# Patient Record
Sex: FEMALE | Race: OTHER | HISPANIC OR LATINO
[De-identification: names, ages, dates, MRNs, and addresses within clinical notes are randomized per-mention and may not be internally consistent; named-entity substitution may affect disease eponyms.]

---

## 2018-07-09 ENCOUNTER — TRANSCRIPTION ENCOUNTER (OUTPATIENT)
Age: 28
End: 2018-07-09

## 2019-09-12 ENCOUNTER — TRANSCRIPTION ENCOUNTER (OUTPATIENT)
Age: 29
End: 2019-09-12

## 2020-11-17 ENCOUNTER — TRANSCRIPTION ENCOUNTER (OUTPATIENT)
Age: 30
End: 2020-11-17

## 2021-01-11 ENCOUNTER — TRANSCRIPTION ENCOUNTER (OUTPATIENT)
Age: 31
End: 2021-01-11

## 2021-03-07 ENCOUNTER — TRANSCRIPTION ENCOUNTER (OUTPATIENT)
Age: 31
End: 2021-03-07

## 2021-12-14 ENCOUNTER — EMERGENCY (EMERGENCY)
Facility: HOSPITAL | Age: 31
LOS: 0 days | Discharge: HOME | End: 2021-12-14
Attending: EMERGENCY MEDICINE | Admitting: EMERGENCY MEDICINE
Payer: COMMERCIAL

## 2021-12-14 VITALS
DIASTOLIC BLOOD PRESSURE: 70 MMHG | RESPIRATION RATE: 16 BRPM | HEART RATE: 90 BPM | WEIGHT: 154.1 LBS | TEMPERATURE: 98 F | OXYGEN SATURATION: 97 % | SYSTOLIC BLOOD PRESSURE: 120 MMHG

## 2021-12-14 VITALS
DIASTOLIC BLOOD PRESSURE: 66 MMHG | SYSTOLIC BLOOD PRESSURE: 105 MMHG | HEART RATE: 71 BPM | OXYGEN SATURATION: 97 % | TEMPERATURE: 98 F | RESPIRATION RATE: 16 BRPM

## 2021-12-14 DIAGNOSIS — R07.89 OTHER CHEST PAIN: ICD-10-CM

## 2021-12-14 DIAGNOSIS — R11.0 NAUSEA: ICD-10-CM

## 2021-12-14 DIAGNOSIS — R10.9 UNSPECIFIED ABDOMINAL PAIN: ICD-10-CM

## 2021-12-14 DIAGNOSIS — Z88.0 ALLERGY STATUS TO PENICILLIN: ICD-10-CM

## 2021-12-14 LAB
ALBUMIN SERPL ELPH-MCNC: 5.4 G/DL — HIGH (ref 3.5–5.2)
ALP SERPL-CCNC: 49 U/L — SIGNIFICANT CHANGE UP (ref 30–115)
ALT FLD-CCNC: 18 U/L — SIGNIFICANT CHANGE UP (ref 0–41)
ANION GAP SERPL CALC-SCNC: 15 MMOL/L — HIGH (ref 7–14)
APPEARANCE UR: CLEAR — SIGNIFICANT CHANGE UP
AST SERPL-CCNC: 36 U/L — SIGNIFICANT CHANGE UP (ref 0–41)
BASOPHILS # BLD AUTO: 0.05 K/UL — SIGNIFICANT CHANGE UP (ref 0–0.2)
BASOPHILS NFR BLD AUTO: 0.7 % — SIGNIFICANT CHANGE UP (ref 0–1)
BILIRUB DIRECT SERPL-MCNC: <0.2 MG/DL — SIGNIFICANT CHANGE UP (ref 0–0.3)
BILIRUB INDIRECT FLD-MCNC: >0.3 MG/DL — SIGNIFICANT CHANGE UP (ref 0.2–1.2)
BILIRUB SERPL-MCNC: 0.5 MG/DL — SIGNIFICANT CHANGE UP (ref 0.2–1.2)
BILIRUB UR-MCNC: NEGATIVE — SIGNIFICANT CHANGE UP
BUN SERPL-MCNC: 7 MG/DL — LOW (ref 10–20)
CALCIUM SERPL-MCNC: 9.8 MG/DL — SIGNIFICANT CHANGE UP (ref 8.5–10.1)
CHLORIDE SERPL-SCNC: 100 MMOL/L — SIGNIFICANT CHANGE UP (ref 98–110)
CO2 SERPL-SCNC: 21 MMOL/L — SIGNIFICANT CHANGE UP (ref 17–32)
COLOR SPEC: COLORLESS — SIGNIFICANT CHANGE UP
CREAT SERPL-MCNC: 0.8 MG/DL — SIGNIFICANT CHANGE UP (ref 0.7–1.5)
DIFF PNL FLD: NEGATIVE — SIGNIFICANT CHANGE UP
EOSINOPHIL # BLD AUTO: 0.06 K/UL — SIGNIFICANT CHANGE UP (ref 0–0.7)
EOSINOPHIL NFR BLD AUTO: 0.8 % — SIGNIFICANT CHANGE UP (ref 0–8)
GLUCOSE SERPL-MCNC: 82 MG/DL — SIGNIFICANT CHANGE UP (ref 70–99)
GLUCOSE UR QL: NEGATIVE — SIGNIFICANT CHANGE UP
HCT VFR BLD CALC: 42.9 % — SIGNIFICANT CHANGE UP (ref 37–47)
HGB BLD-MCNC: 14.5 G/DL — SIGNIFICANT CHANGE UP (ref 12–16)
IMM GRANULOCYTES NFR BLD AUTO: 0.3 % — SIGNIFICANT CHANGE UP (ref 0.1–0.3)
KETONES UR-MCNC: NEGATIVE — SIGNIFICANT CHANGE UP
LACTATE SERPL-SCNC: 1 MMOL/L — SIGNIFICANT CHANGE UP (ref 0.7–2)
LEUKOCYTE ESTERASE UR-ACNC: NEGATIVE — SIGNIFICANT CHANGE UP
LIDOCAIN IGE QN: 43 U/L — SIGNIFICANT CHANGE UP (ref 7–60)
LYMPHOCYTES # BLD AUTO: 3 K/UL — SIGNIFICANT CHANGE UP (ref 1.2–3.4)
LYMPHOCYTES # BLD AUTO: 40.3 % — SIGNIFICANT CHANGE UP (ref 20.5–51.1)
MCHC RBC-ENTMCNC: 31.7 PG — HIGH (ref 27–31)
MCHC RBC-ENTMCNC: 33.8 G/DL — SIGNIFICANT CHANGE UP (ref 32–37)
MCV RBC AUTO: 93.7 FL — SIGNIFICANT CHANGE UP (ref 81–99)
MONOCYTES # BLD AUTO: 0.42 K/UL — SIGNIFICANT CHANGE UP (ref 0.1–0.6)
MONOCYTES NFR BLD AUTO: 5.6 % — SIGNIFICANT CHANGE UP (ref 1.7–9.3)
NEUTROPHILS # BLD AUTO: 3.9 K/UL — SIGNIFICANT CHANGE UP (ref 1.4–6.5)
NEUTROPHILS NFR BLD AUTO: 52.3 % — SIGNIFICANT CHANGE UP (ref 42.2–75.2)
NITRITE UR-MCNC: NEGATIVE — SIGNIFICANT CHANGE UP
NRBC # BLD: 0 /100 WBCS — SIGNIFICANT CHANGE UP (ref 0–0)
PH UR: 6.5 — SIGNIFICANT CHANGE UP (ref 5–8)
PLATELET # BLD AUTO: 249 K/UL — SIGNIFICANT CHANGE UP (ref 130–400)
POTASSIUM SERPL-MCNC: 5.4 MMOL/L — HIGH (ref 3.5–5)
POTASSIUM SERPL-SCNC: 5.4 MMOL/L — HIGH (ref 3.5–5)
PROT SERPL-MCNC: 8.5 G/DL — HIGH (ref 6–8)
PROT UR-MCNC: NEGATIVE — SIGNIFICANT CHANGE UP
RBC # BLD: 4.58 M/UL — SIGNIFICANT CHANGE UP (ref 4.2–5.4)
RBC # FLD: 11.9 % — SIGNIFICANT CHANGE UP (ref 11.5–14.5)
SODIUM SERPL-SCNC: 136 MMOL/L — SIGNIFICANT CHANGE UP (ref 135–146)
SP GR SPEC: 1 — LOW (ref 1.01–1.03)
TROPONIN T SERPL-MCNC: <0.01 NG/ML — SIGNIFICANT CHANGE UP
UROBILINOGEN FLD QL: SIGNIFICANT CHANGE UP
WBC # BLD: 7.45 K/UL — SIGNIFICANT CHANGE UP (ref 4.8–10.8)
WBC # FLD AUTO: 7.45 K/UL — SIGNIFICANT CHANGE UP (ref 4.8–10.8)

## 2021-12-14 PROCEDURE — 93010 ELECTROCARDIOGRAM REPORT: CPT

## 2021-12-14 PROCEDURE — 74177 CT ABD & PELVIS W/CONTRAST: CPT | Mod: 26,MA

## 2021-12-14 PROCEDURE — 76830 TRANSVAGINAL US NON-OB: CPT | Mod: 26

## 2021-12-14 PROCEDURE — 99285 EMERGENCY DEPT VISIT HI MDM: CPT

## 2021-12-14 RX ORDER — SODIUM CHLORIDE 9 MG/ML
1000 INJECTION, SOLUTION INTRAVENOUS ONCE
Refills: 0 | Status: COMPLETED | OUTPATIENT
Start: 2021-12-14 | End: 2021-12-14

## 2021-12-14 RX ADMIN — SODIUM CHLORIDE 1000 MILLILITER(S): 9 INJECTION, SOLUTION INTRAVENOUS at 16:31

## 2021-12-14 NOTE — ED PROVIDER NOTE - ATTENDING CONTRIBUTION TO CARE
32 y/o female with h/o IBS, gerd, in ER with h/o lower abdominal cramping pain for ~ 3 weeks.  Pt states she had similar symptoms last month and her doctor treated her for a UTI, symptoms got better but have been back for the past 3 weeks.  spasms of cramping lower abdominal pain, comes and goes.  +N today.  no vomiting.   no dysuria/hematuria/frequency.  no vag d/c.  lmp 1 month ago.  no back pain.  no f/c.  has been having some mild episodic chest pain - lasts for a few seconds and then resolves.  no sob.  no cough.  no ha/dizziness/syncope.  PE - nad, nc/at, eomi, perrl, op - clear, mmm, cta b/l, no w/r/r, rrr, abd - soft, + mild lower abdominal tenderness, no guarding/rebound, nabs, no cvat, from x 4, no LE swelling/tenderness, A&O x 3, no focal neuro deficits.  -ivf, check labs, ua, pelvic sono, ct scan.

## 2021-12-14 NOTE — ED PROVIDER NOTE - OBJECTIVE STATEMENT
31yF pmhx IBS c/o diffuse abd pain x3wks; intermittent, cramping, worsening; associated w/ LT chest pain for the past day; states she has hx of IBS but current pain is slightly worse, no known exacerbating/alleviating factors. Also reports hx ectopic pregnancy so is concerned about that; LMP approx 1mo ago. Denies fever/chills, SOB, n/v/d, leg pain/swelling, dysuria, hematuria, vaginal discharge.

## 2021-12-14 NOTE — ED PROVIDER NOTE - PHYSICAL EXAMINATION
Vital Signs: Reviewed  GEN: alert, NAD, speaks full sentences  HEAD:  normocephalic, atraumatic  EYES:  PERRLA; conjunctivae without injection, drainage or discharge  ENMT:  nasal mucosa moist; mouth moist without ulcerations or lesions; throat moist without erythema, exudate, ulcerations or lesions  NECK:  supple  CARDIAC:  regular rate, normal S1 and S2, no murmurs  RESP:  respiratory rate and effort appear normal for age; lungs are clear to auscultation bilaterally; no rales or wheezes  ABDOMEN: diffuse discomfort worst RLQ no guarding no rebound; soft, nondistended  : no flank tenderness  MUSCULOSKELETAL/NEURO:  normal movement, normal tone  SKIN:  normal skin color for age and race, well-perfused; warm and dry

## 2021-12-14 NOTE — ED PROVIDER NOTE - CARE PROVIDER_API CALL
HUEY PARDO  Specialist  [address] [place] [address]  Phone: [phone]  Fax: [phone]  Established Patient  Follow Up Time: 1-3 Days

## 2021-12-14 NOTE — ED PROVIDER NOTE - PROGRESS NOTE DETAILS
AG: labs and imaging results d/w pt, all questions answered. Pt reports having bentyl which she has been taking at night w/ some relief of sx. Encouraged f/u w/ Dr beck pt's GI. Strict return precautions discussed.

## 2021-12-14 NOTE — ED PROVIDER NOTE - PATIENT PORTAL LINK FT
You can access the FollowMyHealth Patient Portal offered by [facility] by registering at the following website: [email or web address]. By joining [employer]’s FollowMyHealth portal, you will also be able to view your health information using other applications (apps) compatible with our system.

## 2021-12-14 NOTE — ED PROVIDER NOTE - NSFOLLOWUPINSTRUCTIONS_ED_ALL_ED_FT
Please follow up with your GI doctor in 1-3 days for further evaluation. Return to the emergency department sooner for any new or worsening symptoms.      Abdominal Pain, Adult  Abdominal pain can be caused by many things. Often, abdominal pain is not serious and it gets better with no treatment or by being treated at home. However, sometimes abdominal pain is serious. Your health care provider will do a medical history and a physical exam to try to determine the cause of your abdominal pain.    Follow these instructions at home:  Take over-the-counter and prescription medicines only as told by your health care provider. Do not take a laxative unless told by your health care provider.  ImageDrink enough fluid to keep your urine clear or pale yellow.  Watch your condition for any changes.  Keep all follow-up visits as told by your health care provider. This is important.  Contact a health care provider if:  Your abdominal pain changes or gets worse.  You are not hungry or you lose weight without trying.  You are constipated or have diarrhea for more than 2–3 days.  You have pain when you urinate or have a bowel movement.  Your abdominal pain wakes you up at night.  Your pain gets worse with meals, after eating, or with certain foods.  You are throwing up and cannot keep anything down.  You have a fever.  Get help right away if:  Your pain does not go away as soon as your health care provider told you to expect.  You cannot stop throwing up.  Your pain is only in areas of the abdomen, such as the right side or the left lower portion of the abdomen.  You have bloody or black stools, or stools that look like tar.  You have severe pain, cramping, or bloating in your abdomen.  You have signs of dehydration, such as:    Dark urine, very little urine, or no urine.  Cracked lips.  Dry mouth.  Sunken eyes.  Sleepiness.  Weakness.    This information is not intended to replace advice given to you by your health care provider. Make sure you discuss any questions you have with your health care provider

## 2021-12-14 NOTE — ED PROVIDER NOTE - NS ED ROS FT
Review of Systems:  	•	CONSTITUTIONAL - no fever, no diaphoresis  	•	SKIN - no rash, no lesions  	•	HEMATOLOGIC - no bleeding, no bruising  	•	EYES - no discharge, no injection  	•	ENT - no sore throat, no runny nose  	•	RESPIRATORY - no shortness of breath, no cough  	•	CARDIAC - +chest pain, no palpitations  	•	GI - +abd pain, no nausea, no vomiting, no diarrhea  	•	GENITO-URINARY - no dysuria, no hematuria  	•	MUSCULOSKELETAL - no joint pain, no muscle aches  	•	NEUROLOGIC - no dizziness, no headache

## 2021-12-16 LAB
CULTURE RESULTS: SIGNIFICANT CHANGE UP
SPECIMEN SOURCE: SIGNIFICANT CHANGE UP

## 2022-01-25 PROBLEM — K58.9 IRRITABLE BOWEL SYNDROME WITHOUT DIARRHEA: Chronic | Status: ACTIVE | Noted: 2021-12-14

## 2022-01-25 PROBLEM — K58.9 IRRITABLE BOWEL SYNDROME, UNSPECIFIED: Chronic | Status: ACTIVE | Noted: 2021-12-14

## 2022-02-24 ENCOUNTER — APPOINTMENT (OUTPATIENT)
Dept: OBGYN | Facility: CLINIC | Age: 32
End: 2022-02-24

## 2022-03-09 PROBLEM — Z00.00 ENCOUNTER FOR PREVENTIVE HEALTH EXAMINATION: Status: ACTIVE | Noted: 2022-03-09

## 2022-03-24 ENCOUNTER — APPOINTMENT (OUTPATIENT)
Dept: OBGYN | Facility: CLINIC | Age: 32
End: 2022-03-24
Payer: COMMERCIAL

## 2022-03-24 VITALS
SYSTOLIC BLOOD PRESSURE: 108 MMHG | HEIGHT: 65 IN | BODY MASS INDEX: 23.66 KG/M2 | DIASTOLIC BLOOD PRESSURE: 64 MMHG | WEIGHT: 142 LBS

## 2022-03-24 DIAGNOSIS — Z83.0 FAMILY HISTORY OF HUMAN IMMUNODEFICIENCY VIRUS [HIV] DISEASE: ICD-10-CM

## 2022-03-24 DIAGNOSIS — Z78.9 OTHER SPECIFIED HEALTH STATUS: ICD-10-CM

## 2022-03-24 DIAGNOSIS — Z82.49 FAMILY HISTORY OF ISCHEMIC HEART DISEASE AND OTHER DISEASES OF THE CIRCULATORY SYSTEM: ICD-10-CM

## 2022-03-24 DIAGNOSIS — Z11.3 ENCOUNTER FOR SCREENING FOR INFECTIONS WITH A PREDOMINANTLY SEXUAL MODE OF TRANSMISSION: ICD-10-CM

## 2022-03-24 DIAGNOSIS — Z01.419 ENCOUNTER FOR GYNECOLOGICAL EXAMINATION (GENERAL) (ROUTINE) W/OUT ABNORMAL FINDINGS: ICD-10-CM

## 2022-03-24 PROCEDURE — 99385 PREV VISIT NEW AGE 18-39: CPT

## 2022-03-24 NOTE — DISCUSSION/SUMMARY
[FreeTextEntry1] : Pap, gonorrhea and Chlamydia test done\par Labs for other STIs, quantitative beta-hCG in 2 weeks.\par Keep menstrual calendar\par Follow-up yearly or as needed\par

## 2022-03-24 NOTE — HISTORY OF PRESENT ILLNESS
[FreeTextEntry1] : Patient is 32 years old para 1-0-0-1 last menstrual period March 10, 2022.\par Patient states that she took Plan B on March 21, 2022.  She states that she has regular menstrual periods every month and is presently without complaints.  Patient requests testing for STIs.

## 2022-03-24 NOTE — PHYSICAL EXAM
[Appropriately responsive] : appropriately responsive [Alert] : alert [No Acute Distress] : no acute distress [No Lymphadenopathy] : no lymphadenopathy [Regular Rate Rhythm] : regular rate rhythm [No Murmurs] : no murmurs [Clear to Auscultation B/L] : clear to auscultation bilaterally [Soft] : soft [Non-tender] : non-tender [Non-distended] : non-distended [No Lesions] : no lesions [No HSM] : No HSM [No Mass] : no mass [Oriented x3] : oriented x3 [Examination Of The Breasts] : a normal appearance [] : implants [No Masses] : no breast masses were palpable [Labia Majora] : normal [Labia Minora] : normal [Normal] : normal [Uterine Adnexae] : normal

## 2022-06-28 ENCOUNTER — APPOINTMENT (OUTPATIENT)
Dept: PAIN MANAGEMENT | Facility: CLINIC | Age: 32
End: 2022-06-28

## 2022-06-28 VITALS
HEIGHT: 65 IN | HEART RATE: 88 BPM | WEIGHT: 142 LBS | SYSTOLIC BLOOD PRESSURE: 127 MMHG | BODY MASS INDEX: 23.66 KG/M2 | DIASTOLIC BLOOD PRESSURE: 78 MMHG

## 2022-06-28 PROCEDURE — 99214 OFFICE O/P EST MOD 30 MIN: CPT | Mod: PAB

## 2022-06-28 PROCEDURE — 99072 ADDL SUPL MATRL&STAF TM PHE: CPT

## 2022-06-28 RX ORDER — CYCLOBENZAPRINE HYDROCHLORIDE 10 MG/1
10 TABLET, FILM COATED ORAL
Qty: 28 | Refills: 0 | Status: ACTIVE | COMMUNITY
Start: 2022-05-16

## 2022-06-28 RX ORDER — PREDNISONE 50 MG/1
50 TABLET ORAL DAILY
Qty: 7 | Refills: 0 | Status: ACTIVE | COMMUNITY
Start: 2022-06-28 | End: 1900-01-01

## 2022-06-28 NOTE — HISTORY OF PRESENT ILLNESS
[FreeTextEntry1] : HPI: Ms. Zak Groves, a 32-year-old female, presents today after she sustained a fall while she was at work on 03/24/2022 down a flight of stairs. She states she did not hit her head or lose any consciousness. After the fall, she states she developed neck, thoracic, lumbar, pelvic pain and chief complaints of new onset headaches. She states she did go to the urgent care was prescribed some medications which were not helpful. She states she also had x-rays of the entire spine which did not show any fractures.\par \par In regards to her neck pain, she states the pain happens constant in nature. She states the pain is severe and is currently rated as a 10/10 on the pain scale. She states the pain is associated with stiffness, spasms and trouble with rotation of the neck. She states the pain radiates into the right shoulder.\par \par In regards the thoracic spine, she states this pain is the worst segment. She currently rates the pain as a 10/10 on the pain scale. She states the pain is constant in nature. She states she has significant trouble with breathing. She states the pain travels into the bilateral thoracic paraspinal area and into the right side of the ribs. She states the pain is present daily.\par \par In regards to her lumbar spine pain, she states the pain is constant in nature. She states the pain is severe and currently rated as a 10/10 on the pain scale. She states she has significant trouble with standing, sitting or walking secondary to the pain. She states the pain occasionally radiates into the right buttock into the right hamstring with some numbness and tingling.\par \par She is also presenting with chief complaints of right-sided diffuse rib pain. She states she also has nonspecific buttock and pelvic pain.\par \par She also has chief complaints of dizziness since the accident. She states she also has headaches which she never had before this incident. She denies any red flags. She states she does get unsteady at times due to the dizziness.\par \par \par  TODAY: The reason for the visit is a revisit encounter regarding her neck and lower back pain. Since her last visit, her pain has overall remained unchanged. She continues to attend physical therapy for the neck and lower back which  initially was gradually improving her pain, however she notes today her pain is currently moderate to severe.\par \par In regards to her neck pain, she continues to complain of ongoing headaches which can be quite bothersome.   She also reports of pain, pressure, and tightness radiating into bilateral shoulders. She also notes limited range of motion in her neck. Pain is moderate to severe, constant, rated 8/10.  MRI of the cervical spine was reviewed with her today.  \par \par In regards to her lower back pain, pain radiates across the lower back and into her lower extremities bilaterally.  MRI of the lumbar spine was reviewed with her today.  She states she was at a party on Sunday and sitting down.  However the next day, her pain significantly increased.   She states her pain is a 10/10.  She is constantly shifting her positions while sitting and going from a seated to standing position to help alleviate her pain.  Initially it was her neck pain that was her main complaint however since Sunday, the lumbar radicular pain has been  quite severe.  She notes difficulty with ambulation, and with everyday activities.  We did discuss epidural injections as well however she is quite hesitant in proceeding with them.\par \par She also continues to have midback pain in the thoracic area.  She describes the pain as a burning pain which can be exacerbated with simplest movements. We will address her cervical   and lumbar pain first.\par \par She does have a right knee pain from the injury as well for which she is under the care of Orthopedics. \par

## 2022-06-28 NOTE — DATA REVIEWED
[FreeTextEntry1] : MRI of the lumbar spine 05/26/2022:  Broad-based central posterior disc herniation L3-L4 indenting the thecal sac.  Right foraminal herniation with annular tear at L4-L5 abutting the right foramina and the exiting right L4 nerve root. \par \par  MRI of the cervical spine 05/26/2022:  Broad-based right paracentral disc herniation with annular tear at C4-C5 abutting the ventral aspect of the spinal cord.   Straightening of the normal lordosis which may be secondary to spasms.

## 2022-06-28 NOTE — REASON FOR VISIT
[FreeTextEntry2] : Patient presents to office today for a follow up on her neck and back pain as well as receiving MRI results

## 2022-06-28 NOTE — PHYSICAL EXAM
[Flexion] : flexion [Extension] : extension [Bending to right] : bending to right [] : patient ambulates without assistive device [FreeTextEntry8] : tenderness at the base of the neck

## 2022-07-21 ENCOUNTER — FORM ENCOUNTER (OUTPATIENT)
Age: 32
End: 2022-07-21

## 2022-08-08 ENCOUNTER — APPOINTMENT (OUTPATIENT)
Dept: PAIN MANAGEMENT | Facility: CLINIC | Age: 32
End: 2022-08-08

## 2022-08-08 VITALS
BODY MASS INDEX: 24.16 KG/M2 | WEIGHT: 145 LBS | HEART RATE: 100 BPM | HEIGHT: 65 IN | DIASTOLIC BLOOD PRESSURE: 79 MMHG | SYSTOLIC BLOOD PRESSURE: 121 MMHG

## 2022-08-08 PROCEDURE — 96136 PSYCL/NRPSYC TST PHY/QHP 1ST: CPT | Mod: 59

## 2022-08-08 PROCEDURE — 99215 OFFICE O/P EST HI 40 MIN: CPT | Mod: 25

## 2022-08-08 PROCEDURE — 99072 ADDL SUPL MATRL&STAF TM PHE: CPT

## 2022-08-08 NOTE — ASSESSMENT
[FreeTextEntry1] : 32 year old female presenting with ongoing pain at her cervical and lumbar spine. Patient is presenting with radicular pain with impairment in ADLs and functionality.  The pain has not responded to conservative care, including medications, stretching, as well as active modalities, such as physical therapy.  Imaging studies as well as physical exam findings corroborate the symptomatology and radicular pain.  We will proceed with an epidural at this point. As for her neck pain, we will proceed with a cervical epidural steroid injection with sedation. As for her lumbar pain, we will proceed with a Bilateral L4-5 trasnforaminal epidural steroid injection with sedation. Follow up in 6 weeks will be made for reassessment.\par \par Cervical epidural steroid injection with sedation.\par \par Patient had a MRI that shows a radicular component along with pain referred into the upper extremity. Patient has trialed rehab (Home exercise, physical therapy or chiropractic care) and medications. I will schedule a C7-T1 cervical epidural steroid injection.\par \par Bilateral L4-5 transforaminal epidural steroid injection with sedation.\par \par Patient had a MRI that shows a radicular component along with pain referred into the lower extremity. Patient has trialed rehab (Home exercise, physical therapy or chiropractic care) and medications I will schedule a L4-5 SNRI.\par \par We encourage a home exercise program, stressing walking and strengthening of the core. We have recommended exercises such as the modified plank, Elaina exercise, elliptical , recumbent bike, as well as shoulder griddle strengthening. We discussed recreational activities such as swimming, Luis Chi and yoga. Use things that heat like hot shower or icy heat before rehab and exercising and at the beginning of the day, and ice (ice in a bag never directly on the skin) after activity and at the end of the day.\par \par No medications were given at todays visit.\par \par Neuropsychological SOAPP testing was performed as an evaluation of cognition, mood, personality, behavior to assess likelihood of addiction, misuse, and other aberrant medication-related behaviors. The testing quantifies a patient's requirement for monitoring if/when long-term opioid therapy or other controlled substances might be required.  The total time spent rendering and interpreting the service was approximately 20 minutes. Results will be implemented in the appropriate care of the patient\par \par Disability status:\par Temporary total disability. Patient is unable to return to work at this time.\par \par A total of 33 minutes was spent on this visit, reviewing previous notes/consultations/imaging, counseling the patient on appropriate biomechanics impacting the pain, ordering tests if needed, refilling meds if needed, and documenting the findings in the note.\par \par Entered by Wanda Vidal, acting as scribe for Dr. Moreno.\par  \par The documentation recorded by the scribe, in my presence, accurately reflects the service I personally performed, and the decisions made by me with my edits as appropriate.\par  \par Best Regards, \par Suresh Moreno MD \par Board Certified, Anesthesiology \par Board Certified, Pain Medicine\par \par

## 2022-08-08 NOTE — HISTORY OF PRESENT ILLNESS
[FreeTextEntry1] : HPI: Ms. Zak Groves, a 32-year-old female, presents today after she sustained a fall while she was at work on 03/24/2022 down a flight of stairs. She states she did not hit her head or lose any consciousness. After the fall, she states she developed neck, thoracic, lumbar, pelvic pain and chief complaints of new onset headaches. She states she did go to the urgent care was prescribed some medications which were not helpful. She states she also had x-rays of the entire spine which did not show any fractures.\par \par In regards to her neck pain, she states the pain happens constant in nature. She states the pain is severe and is currently rated as a 10/10 on the pain scale. She states the pain is associated with stiffness, spasms and trouble with rotation of the neck. She states the pain radiates into the right shoulder.\par \par In regards the thoracic spine, she states this pain is the worst segment. She currently rates the pain as a 10/10 on the pain scale. She states the pain is constant in nature. She states she has significant trouble with breathing. She states the pain travels into the bilateral thoracic paraspinal area and into the right side of the ribs. She states the pain is present daily.\par \par In regards to her lumbar spine pain, she states the pain is constant in nature. She states the pain is severe and currently rated as a 10/10 on the pain scale. She states she has significant trouble with standing, sitting or walking secondary to the pain. She states the pain occasionally radiates into the right buttock into the right hamstring with some numbness and tingling.\par \par She is also presenting with chief complaints of right-sided diffuse rib pain. She states she also has nonspecific buttock and pelvic pain.\par \par She also has chief complaints of dizziness since the accident. She states she also has headaches which she never had before this incident. She denies any red flags. She states she does get unsteady at times due to the dizziness.\par \par TODAY: Since last visit, she continues with pain at multiple sites. \par \par In regards to her neck pain, she states the pain happens constant in nature. She states the pain is associated with stiffness, spasms and trouble with rotation of the neck. She states her hands at times turn blue secondary to the numbness in the hands. She states the pain radiates into the right shoulder. She states the pain is severe and is currently rated as a 10/10 on the pain scale. She states the pain is associated with stiffness, spasms and trouble with rotation of the neck. She states the pain radiates into the right shoulder.\par \par In regards the thoracic spine, she states this pain is the worst segment. She states the pain is constant in nature. She currently rates the pain as a 10/10 on the pain scale. She continues with significant trouble with breathing. She states the pain travels into the bilateral thoracic paraspinal area and into the right side of the ribs. \par \par In regards to her lumbar spine pain, she states the pain is present daily. She states the pain is severe and currently rated as a 10/10 on the pain scale. She states she has significant trouble with standing, sitting or walking secondary to the pain. She states the pain occasionally radiates into the right buttock into the right hamstring with some numbness and tingling.\par \par

## 2022-08-08 NOTE — PHYSICAL EXAM
[de-identified] : Constitutional\par GENERAL APPEARANCE OF PATIENT IS WELL DEVELOPED, WELL NOURISHED, BODY HABITUS NORMAL, WELL GROOMED, NO DEFORMITIES NOTED. \par \par Head\par -          Atraumatic and Normocephalic \par \par Eyes, Nose, and Throat:\par -          External inspection of ears and nose are normal overall without scars, lesions, or masses noted\par -          Assessment of hearing is normal\par \par Neck\par -          Examination of neck shows no masses, overall appearance is normal, neck is symmetric, tracheal position is midline, no crepitus is noted\par -          Examination of thyroid shows no enlargement, tenderness or masses\par \par Respiratory\par -          Assessment of respiratory effort shows no intercostal retractions, no use of accessory muscles, unlabored breathing, and normal diaphragmatic movement.\par \par Cardiovascular\par -          Examination of extremities show no edema or varicosities\par \par Musculoskeletal\par -           Inspection and palpation of digits and nails shows no clubbing, cyanosis, nodules, drainage, fluctuance, petechiae\par \par 1)         Spine- tenderness into the thoracic/lumbar paraspinals. ROM restricted. Pain with flexion. Positive SLR bilaterally. \par \par 2)         Neck- tenderness into the cervical paraspinals. ROM restricted. Pain with flexion. Positive spurling bilaterally. \par \par 3)         RUE- inspection and palpation shows no misalignment, asymmetry, crepitation, defects, tenderness, masses, effusions. ROM is normal without crepitation or contracture. No instability or subluxation or laxity is noted. No abnormal movements.\par \par 4)         LUE-inspection and palpation shows no misalignment, asymmetry, crepitation, defects, tenderness, masses, effusions. ROM is normal without crepitation or contracture. No instability or subluxation or laxity is noted. No abnormal movements.\par \par 5)         RLE- inspection and palpation shows no misalignment, asymmetry, crepitation, defects, tenderness, masses, effusions. ROM is normal without crepitation or contracture. No instability or subluxation or laxity is noted. No abnormal movements.\par \par 6)         LLE-inspection and palpation shows no misalignment, asymmetry, crepitation, defects, tenderness, masses, effusions. ROM is normal without crepitation or contracture. No instability or subluxation or laxity is noted. No abnormal movements.\par \par Skin\par -           Inspection of skin and subcutaneous tissue shows no rashes, lesions or ulcers\par -           Palpation of skin and subcutaneous tissue shows no rashes, no indurations, subcutaneous nodules or tightening.\par \par  Abdomen\par -           Soft; Non-tender\par \par Neurologic\par -           CN 2-12 are grossly intact\par -           No sensory or motor deficits in the upper and lower extremities\par -           Adequate strength in upper and lower extremities\par \par Psychiatric\par -           Patients judgment and insight are intact\par -           Oriented to time, place and person\par -           Recent and remote memory intact.\par

## 2022-08-08 NOTE — DATA REVIEWED
[FreeTextEntry1] : MRI of the lumbar spine 05/26/2022: Broad-based central posterior disc herniation L3-L4 indenting the thecal sac. Right foraminal herniation with annular tear at L4-L5 abutting the right foramina and the exiting right L4 nerve root. \par \par  MRI of the cervical spine 05/26/2022: Broad-based right paracentral disc herniation with annular tear at C4-C5 abutting the ventral aspect of the spinal cord. Straightening of the normal lordosis which may be secondary to spasms. \par \par SOAPP: Scored a 0 , low risk.\par  \par [place] REGISTRY: Reviewed .\par  \par UDS: No data obtained today. \par  \par Medications that trigger a UDS: Benzodiazepines (Ativan, Xanax, Valium) etc, Barbiturates, Narcotics (Avinza, Butrans, hydrocodone, Codeine, Pura, Methadone, Morphine, MS Contin, Opana, oxycodone, Oxycontin, Suboxone etc), Pregabalin (Lyrica), Tramadol (Ultacet, Utram etc), Tapentadol, (Nucynta) and Elist Drugs (cocaine, THC, Etc.)\par  \par Risk factors: Bipolar Illness, positive for any an illicit drugs, history of any ETOH and drug abuse, any signs of diversion, Sharing Meds, selling meds. Non consistent [place] drug reporting and above 120meq of morphine\par  \par Low risk: Patient has combination of a low risk SOAP and no risk factors. UDS would be repeated randomly every quarter\par

## 2022-08-31 ENCOUNTER — EMERGENCY (EMERGENCY)
Facility: HOSPITAL | Age: 32
LOS: 0 days | Discharge: HOME | End: 2022-08-31
Attending: EMERGENCY MEDICINE | Admitting: EMERGENCY MEDICINE

## 2022-08-31 ENCOUNTER — APPOINTMENT (OUTPATIENT)
Dept: PAIN MANAGEMENT | Facility: CLINIC | Age: 32
End: 2022-08-31

## 2022-08-31 VITALS
WEIGHT: 145.06 LBS | DIASTOLIC BLOOD PRESSURE: 59 MMHG | RESPIRATION RATE: 20 BRPM | SYSTOLIC BLOOD PRESSURE: 118 MMHG | HEART RATE: 91 BPM | OXYGEN SATURATION: 98 % | TEMPERATURE: 98 F

## 2022-08-31 VITALS
OXYGEN SATURATION: 100 % | DIASTOLIC BLOOD PRESSURE: 54 MMHG | TEMPERATURE: 97 F | HEART RATE: 72 BPM | RESPIRATION RATE: 18 BRPM | SYSTOLIC BLOOD PRESSURE: 103 MMHG

## 2022-08-31 DIAGNOSIS — K13.0 DISEASES OF LIPS: ICD-10-CM
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
## 2022-08-31 DIAGNOSIS — Z88.0 ALLERGY STATUS TO PENICILLIN: ICD-10-CM

## 2022-08-31 DIAGNOSIS — R11.0 NAUSEA: ICD-10-CM
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
## 2022-08-31 DIAGNOSIS — T78.40XA ALLERGY, UNSPECIFIED, INITIAL ENCOUNTER: ICD-10-CM

## 2022-08-31 DIAGNOSIS — L50.9 URTICARIA, UNSPECIFIED: ICD-10-CM

## 2022-08-31 PROCEDURE — 99284 EMERGENCY DEPT VISIT MOD MDM: CPT

## 2022-08-31 RX ORDER — IBUPROFEN 200 MG
600 TABLET ORAL ONCE
Refills: 0 | Status: COMPLETED | OUTPATIENT
Start: 2022-08-31 | End: 2022-08-31

## 2022-08-31 RX ORDER — EPINEPHRINE 0.3 MG/.3ML
0.3 INJECTION INTRAMUSCULAR; SUBCUTANEOUS
Qty: 1 | Refills: 0
Start: 2022-08-31

## 2022-08-31 RX ORDER — FAMOTIDINE 10 MG/ML
1 INJECTION INTRAVENOUS
Qty: 5 | Refills: 0
Start: 2022-08-31 | End: 2022-09-04

## 2022-08-31 RX ORDER — FAMOTIDINE 10 MG/ML
20 INJECTION INTRAVENOUS ONCE
Refills: 0 | Status: COMPLETED | OUTPATIENT
Start: 2022-08-31 | End: 2022-08-31

## 2022-08-31 RX ORDER — EPINEPHRINE 0.3 MG/.3ML
0.3 INJECTION INTRAMUSCULAR; SUBCUTANEOUS
Qty: 2 | Refills: 0
Start: 2022-08-31 | End: 2022-08-31

## 2022-08-31 RX ORDER — DIPHENHYDRAMINE HCL 50 MG
1 CAPSULE ORAL
Qty: 15 | Refills: 0
Start: 2022-08-31 | End: 2022-09-04

## 2022-08-31 RX ORDER — DEXAMETHASONE 0.5 MG/5ML
10 ELIXIR ORAL ONCE
Refills: 0 | Status: COMPLETED | OUTPATIENT
Start: 2022-08-31 | End: 2022-08-31

## 2022-08-31 RX ORDER — EPINEPHRINE 0.3 MG/.3ML
0.3 INJECTION INTRAMUSCULAR; SUBCUTANEOUS ONCE
Refills: 0 | Status: COMPLETED | OUTPATIENT
Start: 2022-08-31 | End: 2022-08-31

## 2022-08-31 RX ORDER — DIPHENHYDRAMINE HCL 50 MG
50 CAPSULE ORAL ONCE
Refills: 0 | Status: COMPLETED | OUTPATIENT
Start: 2022-08-31 | End: 2022-08-31

## 2022-08-31 RX ORDER — SODIUM CHLORIDE 9 MG/ML
1000 INJECTION, SOLUTION INTRAVENOUS ONCE
Refills: 0 | Status: COMPLETED | OUTPATIENT
Start: 2022-08-31 | End: 2022-08-31

## 2022-08-31 RX ADMIN — FAMOTIDINE 104 MILLIGRAM(S): 10 INJECTION INTRAVENOUS at 04:45

## 2022-08-31 RX ADMIN — Medication 10 MILLIGRAM(S): at 04:46

## 2022-08-31 RX ADMIN — Medication 600 MILLIGRAM(S): at 06:53

## 2022-08-31 RX ADMIN — SODIUM CHLORIDE 1000 MILLILITER(S): 9 INJECTION, SOLUTION INTRAVENOUS at 06:55

## 2022-08-31 RX ADMIN — Medication 50 MILLIGRAM(S): at 04:43

## 2022-08-31 RX ADMIN — Medication 600 MILLIGRAM(S): at 05:32

## 2022-08-31 RX ADMIN — FAMOTIDINE 20 MILLIGRAM(S): 10 INJECTION INTRAVENOUS at 05:00

## 2022-08-31 RX ADMIN — EPINEPHRINE 0.3 MILLIGRAM(S): 0.3 INJECTION INTRAMUSCULAR; SUBCUTANEOUS at 04:58

## 2022-08-31 NOTE — ED PROVIDER NOTE - OBJECTIVE STATEMENT
31 yo F no pmhx, no known allergies presenting to the ED for evaluation of diffuse hives, lip swelling, facial swelling and nausea worsening since 9pm. pt reports she cleaned her kitchen and shortly after developed diffuse hives. Denies any throat swelling, chest pain, sob, fever, chills, vomiting.

## 2022-08-31 NOTE — ED ADULT NURSE REASSESSMENT NOTE - NS ED NURSE REASSESS COMMENT FT1
pt here AAOX3 denies complaints. reports improvement of symptoms. Denies complaints. awaiting discharge.

## 2022-08-31 NOTE — ED PROVIDER NOTE - CLINICAL SUMMARY MEDICAL DECISION MAKING FREE TEXT BOX
Patient signed out to me from Dr. Rader.  Patient feeling significantly improved.  Airway intact, no drooling, no stridor, no pooling of secretions, no posterior oropharyngeal erythema/edema/lesions. Speaking in full sentences. +tolerating secretions, tolerating PO.  No abdominal pain, wheezing.  Comfortable with discharge and follow-up outpatient, strict return precautions given. Endorses understanding of all of this and aware that they can return at any time for new or concerning symptoms. No further questions or concerns at this time

## 2022-08-31 NOTE — ED PROVIDER NOTE - ATTENDING APP SHARED VISIT CONTRIBUTION OF CARE
Attending Statement: I have personally provided the amount of critical care time documented below excluding time spent on separate procedures.     Critical Care Time Spent (min) Must be 30 or more minutes to qualify: 35.     32F no pmh p/w allergic rxn since 9pm. Was cleaning her pantry with a clorox wipe prior to sx onset, dvlpd intense diffuse itching followed by slowely worsening diffuse urticaria, erythema, eyelid edema. Also rpts swelling of lips, however used lip filler last month, as well as nausea. Took benadryl unknown dose ~midnight w/min relief. No dizziness, cp/sob/huber, cough, v/d. No prior h/o similar allergic rxn or anaphylaxis.    PE:  nad  skin- diffuse erythema/urticaria  ncat  eent- bl eyelid edema/conjunctival injection, facial erythema/edema, ?mild lip swelling (filler present), op clear tongue/pharynx nml, uvula midline, no stridor/drooling phonating normally  neck supple  rrr nl s1s2 no mrg  nml wob good air entry bl ctab no wrr  abd soft ntnd no palpable masses no rgr  back non-tender no cvat  ext no cce dpi  neuro aaox3 grossly nf exam

## 2022-08-31 NOTE — ED PROVIDER NOTE - NS ED ROS FT
Constitutional: No fever, chills.  Eyes:  No visual changes  ENMT:  No neck pain  Cardiac:  No chest pain  Respiratory:  No cough, SOB  GI:  (+)nausea. no vomiting, diarrhea, abdominal pain.  :  No dysuria, hematuria  MS:  No back pain.  Neuro:  No headache or lightheadedness  Skin:  (+)skin rash  Endocrine: No history of thyroid disease or diabetes.  Except as documented in the HPI,  all other systems are negative.

## 2022-08-31 NOTE — ED PROVIDER NOTE - CARE PROVIDER_API CALL
Leyla Fung)  Allergy and Immunology; Internal Medicine  [address] [place] [address]  Phone: [phone]  Fax: [phone]  Follow Up Time:

## 2022-08-31 NOTE — ED PROVIDER NOTE - NS ED ATTENDING STATEMENT MOD
This was a shared visit with the ABEL. I reviewed and verified the documentation and independently performed the documented:

## 2022-08-31 NOTE — ED ADULT NURSE NOTE - OBJECTIVE STATEMENT
Pt with c/o  hives all over body, no SOB , denies pain , speaks in full sentences , negative tongue swelling , no respiratory distress

## 2022-08-31 NOTE — ED PROVIDER NOTE - PATIENT PORTAL LINK FT
You can access the FollowMyHealth Patient Portal offered by [facility] by registering at the following website: [email or web address]. By joining [facility]’s FollowMyHealth portal, you will also be able to view your health information using other applications (apps) compatible with our system.

## 2022-08-31 NOTE — ED PROVIDER NOTE - PHYSICAL EXAMINATION
GENERAL: Well-nourished, Well-developed. NAD.  HEAD: No visible or palpable bumps or hematomas. No ecchymosis behind ears B/L.  Eyes: PERRLA, EOMI.   ENMT: MMM. No pharyngeal erythema or exudates. Uvula midline. No oral lesions. (+)mild lip swelling (pt reports she gets lip injections). no tongue swelling. airway patent. handling secretions.   CVS: Normal S1,S2. No murmurs appreciated on auscultation   RESP: No use of accessory muscles. Chest rise symmetrical with good expansion. Lungs clear to auscultation B/L. No wheezing, rales, or rhonchi auscultated.  Skin: (+)diffuse hives  Neuro: AA&O x 3. Sensation grossly intact. Strength 5/5 B/L. Gait within normal limits.

## 2022-09-01 ENCOUNTER — FORM ENCOUNTER (OUTPATIENT)
Age: 32
End: 2022-09-01

## 2022-10-04 ENCOUNTER — APPOINTMENT (OUTPATIENT)
Dept: OBGYN | Facility: CLINIC | Age: 32
End: 2022-10-04

## 2022-10-04 VITALS — SYSTOLIC BLOOD PRESSURE: 116 MMHG | HEIGHT: 65 IN | DIASTOLIC BLOOD PRESSURE: 72 MMHG

## 2022-10-04 DIAGNOSIS — R10.2 PELVIC AND PERINEAL PAIN: ICD-10-CM

## 2022-10-04 DIAGNOSIS — N89.8 OTHER SPECIFIED NONINFLAMMATORY DISORDERS OF VAGINA: ICD-10-CM

## 2022-10-04 DIAGNOSIS — R33.9 RETENTION OF URINE, UNSPECIFIED: ICD-10-CM

## 2022-10-04 DIAGNOSIS — R35.0 FREQUENCY OF MICTURITION: ICD-10-CM

## 2022-10-04 LAB
BILIRUB UR QL STRIP: NORMAL
GLUCOSE UR-MCNC: NORMAL
HCG UR QL: NORMAL EU/DL
HGB UR QL STRIP.AUTO: NORMAL
KETONES UR-MCNC: NORMAL
LEUKOCYTE ESTERASE UR QL STRIP: NORMAL
NITRITE UR QL STRIP: NORMAL
PH UR STRIP: NORMAL
PROT UR STRIP-MCNC: NORMAL
SP GR UR STRIP: NORMAL

## 2022-10-04 PROCEDURE — 81002 URINALYSIS NONAUTO W/O SCOPE: CPT

## 2022-10-04 PROCEDURE — 99214 OFFICE O/P EST MOD 30 MIN: CPT

## 2022-10-04 NOTE — HISTORY OF PRESENT ILLNESS
[FreeTextEntry1] : Patient is 32 years old para 1-0-0-1 last menstrual period September 26, 2022.\par Patient states that she has been experiencing urinary symptoms including frequency, pelvic pressure, and feeling of incomplete emptying of the bladder.  Patient states that she was treated at [facility] MD with nitrofurantoin x7 days and was noted to have an allergic reaction which required hospitalization x5 days.  She was diagnosed with erythema multiforme.  Presently she notes vaginal discharge.

## 2022-10-04 NOTE — DISCUSSION/SUMMARY
[FreeTextEntry1] : B VV test done\par Advised urology/urogynecology consultation\par Urine culture sent\par Consult with allergist\par Follow-up as needed

## 2022-10-06 ENCOUNTER — APPOINTMENT (OUTPATIENT)
Dept: PAIN MANAGEMENT | Facility: CLINIC | Age: 32
End: 2022-10-06

## 2022-10-06 VITALS — SYSTOLIC BLOOD PRESSURE: 114 MMHG | DIASTOLIC BLOOD PRESSURE: 71 MMHG | HEART RATE: 65 BPM

## 2022-10-06 PROCEDURE — 99215 OFFICE O/P EST HI 40 MIN: CPT

## 2022-10-06 PROCEDURE — 99072 ADDL SUPL MATRL&STAF TM PHE: CPT

## 2022-10-06 RX ORDER — NORTRIPTYLINE HYDROCHLORIDE 25 MG/1
25 CAPSULE ORAL
Qty: 30 | Refills: 0 | Status: ACTIVE | COMMUNITY
Start: 2022-10-06 | End: 1900-01-01

## 2022-10-06 NOTE — HISTORY OF PRESENT ILLNESS
[FreeTextEntry1] : HPI: Ms. Zak Groves, a 32-year-old female, presents today after she sustained a fall while she was at work on 03/24/2022 down a flight of stairs. She states she did not hit her head or lose any consciousness. After the fall, she states she developed neck, thoracic, lumbar, pelvic pain and chief complaints of new onset headaches. She states she did go to the urgent care was prescribed some medications which were not helpful. She states she also had x-rays of the entire spine which did not show any fractures.\par \par In regards to her neck pain, she states the pain happens constant in nature. She states the pain is severe and is currently rated as a 10/10 on the pain scale. She states the pain is associated with stiffness, spasms and trouble with rotation of the neck. She states the pain radiates into the right shoulder.\par \par In regards the thoracic spine, she states this pain is the worst segment. She currently rates the pain as a 10/10 on the pain scale. She states the pain is constant in nature. She states she has significant trouble with breathing. She states the pain travels into the bilateral thoracic paraspinal area and into the right side of the ribs. She states the pain is present daily.\par \par In regards to her lumbar spine pain, she states the pain is constant in nature. She states the pain is severe and currently rated as a 10/10 on the pain scale. She states she has significant trouble with standing, sitting or walking secondary to the pain. She states the pain occasionally radiates into the right buttock into the right hamstring with some numbness and tingling.\par \par She is also presenting with chief complaints of right-sided diffuse rib pain. She states she also has nonspecific buttock and pelvic pain.\par \par She also has chief complaints of dizziness since the accident. She states she also has headaches which she never had before this incident. She denies any red flags. She states she does get unsteady at times due to the dizziness.\par \par TODAY: Since last visit, she continues with pain at multiple sites. \par \par In regards to her neck pain, she states the pain happens constant in nature. She states the pain is associated with stiffness, spasms and trouble with rotation of the neck. She states her hands at times turn blue secondary to the numbness in the hands. She states the pain radiates into the right shoulder. She states the pain is severe and is currently rated as a 10/10 on the pain scale. She states the pain is associated with stiffness, spasms and trouble with rotation of the neck. She states the pain radiates into the right shoulder.\par \par In regards the thoracic spine, she states this pain is the worst segment. She states the pain is constant in nature. She currently rates the pain as a 10/10 on the pain scale. She continues with significant trouble with breathing. She states the pain travels into the bilateral thoracic paraspinal area and into the right side of the ribs. \par \par In regards to her lumbar spine pain, she states the pain is present daily. She states the pain is severe and currently rated as a 10/10 on the pain scale. She states she has significant trouble with standing, sitting or walking secondary to the pain. She states the pain occasionally radiates into the right buttock into the right hamstring with some numbness and tingling.\par \par She is currently utilizing Meloxicam and Motrin which is providing her with minimal relief.

## 2022-10-06 NOTE — ASSESSMENT
[FreeTextEntry1] : 32 year old female presenting with ongoing pain at her cervical and lumbar spine. Patient is presenting with radicular pain with impairment in ADLs and functionality.  The pain has not responded to conservative care, including medications, stretching, as well as active modalities, such as physical therapy.  Imaging studies as well as physical exam findings corroborate the symptomatology and radicular pain.  We will proceed with an epidural at this point. As for her neck pain, we will proceed with a cervical epidural steroid injection with sedation. As for her lumbar pain, we will proceed with a Bilateral L4-5 transforaminal epidural steroid injection with sedation. For pain control, I will prescribe Nortriptyline to be taken at night. Follow up in 6 weeks will be made for reassessment.\par \par Cervical epidural steroid injection with sedation.\par \par Patient had a MRI that shows a radicular component along with pain referred into the upper extremity. Patient has trialed rehab (Home exercise, physical therapy or chiropractic care) and medications. I will schedule a C7-T1 cervical epidural steroid injection.\par \par Bilateral L4-5 transforaminal epidural steroid injection with sedation.\par \par Patient had a MRI that shows a radicular component along with pain referred into the lower extremity. Patient has trialed rehab (Home exercise, physical therapy or chiropractic care) and medications I will schedule a L4-5 SNRI.\par \par We encourage a home exercise program, stressing walking and strengthening of the core. We have recommended exercises such as the modified plank, Elaina exercise, elliptical , recumbent bike, as well as shoulder griddle strengthening. We discussed recreational activities such as swimming, Luis Chi and yoga. Use things that heat like hot shower or icy heat before rehab and exercising and at the beginning of the day, and ice (ice in a bag never directly on the skin) after activity and at the end of the day.\par \par Disability status:\par Temporary total disability. Patient is unable to return to work at this time.\par \par A total of 33 minutes was spent on this visit, reviewing previous notes/consultations/imaging, counseling the patient on appropriate biomechanics impacting the pain, ordering tests if needed, refilling meds if needed, and documenting the findings in the note.\par \par Entered by Wanda Vidal, acting as scribe for Dr. Moreno.\par  \par The documentation recorded by the scribe, in my presence, accurately reflects the service I personally performed, and the decisions made by me with my edits as appropriate.\par  \par Best Regards, \par Suresh Moreno MD \par Board Certified, Anesthesiology \par Board Certified, Pain Medicine\par \par

## 2022-10-06 NOTE — PHYSICAL EXAM
[de-identified] : Constitutional\par GENERAL APPEARANCE OF PATIENT IS WELL DEVELOPED, WELL NOURISHED, BODY HABITUS NORMAL, WELL GROOMED, NO DEFORMITIES NOTED. \par \par Head\par -          Atraumatic and Normocephalic \par \par Eyes, Nose, and Throat:\par -          External inspection of ears and nose are normal overall without scars, lesions, or masses noted\par -          Assessment of hearing is normal\par \par Neck\par -          Examination of neck shows no masses, overall appearance is normal, neck is symmetric, tracheal position is midline, no crepitus is noted\par -          Examination of thyroid shows no enlargement, tenderness or masses\par \par Respiratory\par -          Assessment of respiratory effort shows no intercostal retractions, no use of accessory muscles, unlabored breathing, and normal diaphragmatic movement.\par \par Cardiovascular\par -          Examination of extremities show no edema or varicosities\par \par Musculoskeletal\par -           Inspection and palpation of digits and nails shows no clubbing, cyanosis, nodules, drainage, fluctuance, petechiae\par \par 1)         Spine- tenderness into the thoracic/lumbar paraspinals. ROM restricted. Pain with flexion. Positive SLR bilaterally. \par \par 2)         Neck- tenderness into the cervical paraspinals. ROM restricted. Pain with flexion. Positive spurling bilaterally. \par \par 3)         RUE- inspection and palpation shows no misalignment, asymmetry, crepitation, defects, tenderness, masses, effusions. ROM is normal without crepitation or contracture. No instability or subluxation or laxity is noted. No abnormal movements.\par \par 4)         LUE-inspection and palpation shows no misalignment, asymmetry, crepitation, defects, tenderness, masses, effusions. ROM is normal without crepitation or contracture. No instability or subluxation or laxity is noted. No abnormal movements.\par \par 5)         RLE- inspection and palpation shows no misalignment, asymmetry, crepitation, defects, tenderness, masses, effusions. ROM is normal without crepitation or contracture. No instability or subluxation or laxity is noted. No abnormal movements.\par \par 6)         LLE-inspection and palpation shows no misalignment, asymmetry, crepitation, defects, tenderness, masses, effusions. ROM is normal without crepitation or contracture. No instability or subluxation or laxity is noted. No abnormal movements.\par \par Skin\par -           Inspection of skin and subcutaneous tissue shows no rashes, lesions or ulcers\par -           Palpation of skin and subcutaneous tissue shows no rashes, no indurations, subcutaneous nodules or tightening.\par \par  Abdomen\par -           Soft; Non-tender\par \par Neurologic\par -           CN 2-12 are grossly intact\par -           No sensory or motor deficits in the upper and lower extremities\par -           Adequate strength in upper and lower extremities\par \par Psychiatric\par -           Patients judgment and insight are intact\par -           Oriented to time, place and person\par -           Recent and remote memory intact.\par

## 2022-10-18 ENCOUNTER — FORM ENCOUNTER (OUTPATIENT)
Age: 32
End: 2022-10-18

## 2022-10-31 ENCOUNTER — FORM ENCOUNTER (OUTPATIENT)
Age: 32
End: 2022-10-31

## 2022-11-09 ENCOUNTER — APPOINTMENT (OUTPATIENT)
Dept: PAIN MANAGEMENT | Facility: CLINIC | Age: 32
End: 2022-11-09

## 2022-11-14 ENCOUNTER — APPOINTMENT (OUTPATIENT)
Dept: PAIN MANAGEMENT | Facility: CLINIC | Age: 32
End: 2022-11-14

## 2022-11-28 ENCOUNTER — APPOINTMENT (OUTPATIENT)
Dept: PAIN MANAGEMENT | Facility: CLINIC | Age: 32
End: 2022-11-28

## 2022-11-28 PROCEDURE — 72040 X-RAY EXAM NECK SPINE 2-3 VW: CPT

## 2022-11-28 PROCEDURE — 93040 RHYTHM ECG WITH REPORT: CPT | Mod: 59

## 2022-11-28 PROCEDURE — 93770 DETERMINATION VENOUS PRESS: CPT

## 2022-11-28 PROCEDURE — 99072 ADDL SUPL MATRL&STAF TM PHE: CPT

## 2022-11-28 PROCEDURE — 94761 N-INVAS EAR/PLS OXIMETRY MLT: CPT

## 2022-11-28 PROCEDURE — 62321 NJX INTERLAMINAR CRV/THRC: CPT

## 2022-11-28 NOTE — PROCEDURE
[FreeTextEntry1] : CERVICAL EPIDURAL STEROID INJECTION [FreeTextEntry3] : Date:  2022  Patient: Zak Rucker  :  1990      Preoperative Diagnosis: Cervical radiculopathy Postoperative Diagnosis: Cervical radiculopathy   Procedure: 1. Interlaminar C7-T1 Cervical Epidural Injection under fluoroscopy 2. Epidurography 3. Fluoroscopic guidance and localization of needle   Physician: Suresh Moreno M.D.   Anesthesia: Local, See nurses notes   Medical Necessity:  Failure of conservative management. Indication for Fluoroscopy:  This procedure requires the precise placement of the spinal needle into the epidural space.  It is the only way to accurately and safely perform the injection. Consent:  Though unusual, the possible complications including infection, bleeding, nerve damage, hospital admission, stroke, pneumothorax, death or failure of the procedure are theoretically possible. The patient was educated about the of the procedure and alternative therapies. All questions were answered and the patient freely gave consent to proceed. The patient was also told that sometimes patients will notice upper and/or lower extremity weakness immediately following the procedure due to extravasation of local anesthetic solution onto the main nerve root during the procedure. In addition, the patient was informed of other possible complications such as phrenic nerve injury, weak/heavy head, or muscle injury.  Lastly, the patient was informed that 1 to 2 weeks of perioperative discomfort following the procedure is to be expected.   Monitoring:  Patient had continuous blood pressure, EKG, and pulse oximetry throughout the case. See nurse's notes.    PROCEDURE NOTE: After obtaining written consent, the patient was positioned prone on the operating table. The back to her neck and upper thorax was prepped with Betadine and draped in usual sterile fashion.  A time out was performed.  The C7-T1 interspace was identified using fluoroscopy. The skin was infiltrated with lidocaine 2% -- 1 cc for subcutaneous analgesia.  The epidural space was identified using a 18g touhy needle with a midline approach using a loss of resistance technique. 2cc omnipaque was used to define the space. A solution of 5 ml of preservative-free sterile saline and 1ml of dexamethasone 10mg, 1cc was infused with minimal pressure on the syringe into the epidural space.  The needle tract and tubing were cleared with 2ml of preservative free normal saline. The procedure was tolerated well. There was no evidence of CSF, Paresthesias nor heme.    Epidurogram: Distal and proximal spread was noted. Findings: Cervical Spine AP and oblique views with x-ray degenerative changes noted.  Complications: none.   Disposition: I have examined the patient and there are no new physical findings since original presentation. The patient was discharged home with a . The discharge instruction sheet was given to the patient. Motor function was intact.    Comment: 1st VIJI today, depending on effectiveness would schedule 2nd VIJI in 1-2 weeks vs caudal epidural steroid vs follow up in office. Call if any problems    This document was signed by:  Suresh Moreno MD  Board Certified, Anesthesiology  Board Certified, Pain Medicine

## 2022-11-29 ENCOUNTER — NON-APPOINTMENT (OUTPATIENT)
Age: 32
End: 2022-11-29

## 2023-01-09 ENCOUNTER — APPOINTMENT (OUTPATIENT)
Dept: PAIN MANAGEMENT | Facility: CLINIC | Age: 33
End: 2023-01-09
Payer: OTHER MISCELLANEOUS

## 2023-01-09 VITALS
SYSTOLIC BLOOD PRESSURE: 121 MMHG | HEART RATE: 65 BPM | WEIGHT: 145 LBS | HEIGHT: 65 IN | BODY MASS INDEX: 24.16 KG/M2 | DIASTOLIC BLOOD PRESSURE: 79 MMHG

## 2023-01-09 PROCEDURE — 99072 ADDL SUPL MATRL&STAF TM PHE: CPT

## 2023-01-09 PROCEDURE — 99215 OFFICE O/P EST HI 40 MIN: CPT

## 2023-01-09 NOTE — PHYSICAL EXAM
[de-identified] : Constitutional\par GENERAL APPEARANCE OF PATIENT IS WELL DEVELOPED, WELL NOURISHED, BODY HABITUS NORMAL, WELL GROOMED, NO DEFORMITIES NOTED. \par \par Head\par -          Atraumatic and Normocephalic \par \par Eyes, Nose, and Throat:\par -          External inspection of ears and nose are normal overall without scars, lesions, or masses noted\par -          Assessment of hearing is normal\par \par Neck\par -          Examination of neck shows no masses, overall appearance is normal, neck is symmetric, tracheal position is midline, no crepitus is noted\par -          Examination of thyroid shows no enlargement, tenderness or masses\par \par Respiratory\par -          Assessment of respiratory effort shows no intercostal retractions, no use of accessory muscles, unlabored breathing, and normal diaphragmatic movement.\par \par Cardiovascular\par -          Examination of extremities show no edema or varicosities\par \par Musculoskeletal\par -           Inspection and palpation of digits and nails shows no clubbing, cyanosis, nodules, drainage, fluctuance, petechiae\par \par 1)         Spine- tenderness into the thoracic/lumbar paraspinals. ROM restricted. Pain with flexion. Positive SLR bilaterally. \par \par 2)         Neck- tenderness into the cervical paraspinals. ROM restricted. Pain with flexion. Positive spurling bilaterally. \par \par 3)         RUE- inspection and palpation shows no misalignment, asymmetry, crepitation, defects, tenderness, masses, effusions. ROM is normal without crepitation or contracture. No instability or subluxation or laxity is noted. No abnormal movements.\par \par 4)         LUE-inspection and palpation shows no misalignment, asymmetry, crepitation, defects, tenderness, masses, effusions. ROM is normal without crepitation or contracture. No instability or subluxation or laxity is noted. No abnormal movements.\par \par 5)         RLE- inspection and palpation shows no misalignment, asymmetry, crepitation, defects, tenderness, masses, effusions. ROM is normal without crepitation or contracture. No instability or subluxation or laxity is noted. No abnormal movements.\par \par 6)         LLE-inspection and palpation shows no misalignment, asymmetry, crepitation, defects, tenderness, masses, effusions. ROM is normal without crepitation or contracture. No instability or subluxation or laxity is noted. No abnormal movements.\par \par Skin\par -           Inspection of skin and subcutaneous tissue shows no rashes, lesions or ulcers\par -           Palpation of skin and subcutaneous tissue shows no rashes, no indurations, subcutaneous nodules or tightening.\par \par  Abdomen\par -           Soft; Non-tender\par \par Neurologic\par -           CN 2-12 are grossly intact\par -           No sensory or motor deficits in the upper and lower extremities\par -           Adequate strength in upper and lower extremities\par \par Psychiatric\par -           Patients judgment and insight are intact\par -           Oriented to time, place and person\par -           Recent and remote memory intact.\par

## 2023-01-09 NOTE — ASSESSMENT
[FreeTextEntry1] : 32 year old female presenting with ongoing pain at her cervical and lumbar spine. She underwent a epidural injection with no relief. She states her pain is now flared up. I would like her to see Dr. Morgan for possible surgical correction. If there is no plan for surgery, I will revisit the option of repeating injections for her cervical spine. She will continue with physical therapy. As for her lower back pain, Patient is presenting with radicular pain with impairment in ADLs and functionality.  The pain has not responded to conservative care, including medications, stretching, as well as active modalities, such as physical therapy.  Imaging studies as well as physical exam findings corroborate the symptomatology and radicular pain.  We will proceed with an epidural at this point. I will proceed with a bilateral L4-5 transforaminal epidural steroid injection with sedation. Follow up in 6 weeks will be made for reassessment. \par \par Physical therapy of the cervical spine 2-3 times a week for 4-8 weeks stressing a home exercise program of walking, shoulder griddle strengthening,  swimming, elliptical , recumbent bike, Luis chi and Yoga. Use things that heat like hot shower or icy heat before rehab, exercising and at the beginning of the day, and ice (ice in a bag never directly on the skin) after activity and at the end of the day.\par  \par Bilateral L4-5 transforaminal epidural steroid injection with sedation.\par \par Patient had a MRI that shows a radicular component along with pain referred into the lower extremity. Patient has trialed rehab (Home exercise, physical therapy or chiropractic care) and medications I will schedule a L4-5 SNRI. \par \par Risk, benefits, pros and cons of procedure were explained to the patient using models and diagrams and their questions were answered. \par \par \par The patient has severe anxiety of procedures that necessitates monitored anesthesia care (MAC). The procedure performed will be close to major nerves, arteries, and spinal cord and/or joint structures. Due to the proximity of these structures, we need the patient to be still during the procedure.  With the help of MAC, this will be safely achieved and decrease the risk of any complications.\par \par \par Disability status:\par Temporary total disability. Patient is unable to return to work at this time.\par \par Entered by Wanda Vidal, acting as scribe for Dr. Moreno.\par  \par The documentation recorded by the scribe, in my presence, accurately reflects the service I personally performed, and the decisions made by me with my edits as appropriate.\par  \par Best Regards, \par Suresh Moreno MD \par Board Certified, Anesthesiology \par Board Certified, Pain Medicine\par \par

## 2023-01-30 ENCOUNTER — FORM ENCOUNTER (OUTPATIENT)
Age: 33
End: 2023-01-30

## 2023-02-20 ENCOUNTER — APPOINTMENT (OUTPATIENT)
Dept: PAIN MANAGEMENT | Facility: CLINIC | Age: 33
End: 2023-02-20
Payer: OTHER MISCELLANEOUS

## 2023-02-20 VITALS — HEIGHT: 65 IN | BODY MASS INDEX: 24.16 KG/M2 | WEIGHT: 145 LBS

## 2023-02-20 PROCEDURE — 99072 ADDL SUPL MATRL&STAF TM PHE: CPT

## 2023-02-20 PROCEDURE — 99215 OFFICE O/P EST HI 40 MIN: CPT

## 2023-02-20 RX ORDER — TRAZODONE HYDROCHLORIDE 100 MG/1
100 TABLET ORAL
Qty: 30 | Refills: 0 | Status: ACTIVE | COMMUNITY
Start: 2023-02-20 | End: 1900-01-01

## 2023-02-20 NOTE — HISTORY OF PRESENT ILLNESS
[FreeTextEntry1] : HPI: Ms. Zak Groves, a 32-year-old female, presents today after she sustained a fall while she was at work on 03/24/2022 down a flight of stairs. She states she did not hit her head or lose any consciousness. After the fall, she states she developed neck, thoracic, lumbar, pelvic pain and chief complaints of new onset headaches. She states she did go to the urgent care was prescribed some medications which were not helpful. She states she also had x-rays of the entire spine which did not show any fractures.\par \par In regards to her neck pain, she states the pain happens constant in nature. She states the pain is severe and is currently rated as a 10/10 on the pain scale. She states the pain is associated with stiffness, spasms and trouble with rotation of the neck. She states the pain radiates into the right shoulder.\par \par In regards the thoracic spine, she states this pain is the worst segment. She currently rates the pain as a 10/10 on the pain scale. She states the pain is constant in nature. She states she has significant trouble with breathing. She states the pain travels into the bilateral thoracic paraspinal area and into the right side of the ribs. She states the pain is present daily.\par \par In regards to her lumbar spine pain, she states the pain is constant in nature. She states the pain is severe and currently rated as a 10/10 on the pain scale. She states she has significant trouble with standing, sitting or walking secondary to the pain. She states the pain occasionally radiates into the right buttock into the right hamstring with some numbness and tingling.\par \par She is also presenting with chief complaints of right-sided diffuse rib pain. She states she also has nonspecific buttock and pelvic pain.\par \par She also has chief complaints of dizziness since the accident. She states she also has headaches which she never had before this incident. She denies any red flags. She states she does get unsteady at times due to the dizziness.\par \par TODAY: Since last visit, she continues with pain at multiple sites. \par \par As for her neck pain, she continues with severe pain. She states the pain is in the neck with radiation into the upper extremities. She underwent a VIJI with little to no relief. She has ongoing severe numbness, tingling and spasms into the arms. She has trouble with her  strength. She states she is also dropping objects at times. She rates the pain at a 9/10 on the pain scale. \par \par In regards the thoracic spine, she states this pain is the worst segment. She states the pain is constant in nature. She currently rates the pain as a 10/10 on the pain scale. She continues with significant trouble with breathing. She states the pain travels into the bilateral thoracic paraspinal area and into the right side of the ribs. \par \par In regards to her lumbar spine pain, she states the pain is present daily. She states the pain is severe and currently rated as a 10/10 on the pain scale. She states she has significant trouble with standing, sitting or walking secondary to the pain. She states the pain occasionally radiates into the right buttock into the right hamstring with some numbness and tingling. She also feels a pulling like sensation in her legs. She has significant trouble sleeping. \par \par She is currently utilizing Meloxicam and Motrin which is providing her with minimal relief.

## 2023-02-20 NOTE — PHYSICAL EXAM
[de-identified] : Spine- tenderness into the thoracic/lumbar paraspinals. ROM restricted. Pain with flexion. Positive SLR bilaterally. \par \par Neck- tenderness into the cervical paraspinals. ROM restricted. Pain with flexion. Positive spurling bilaterally.

## 2023-02-20 NOTE — ASSESSMENT
[FreeTextEntry1] : 32 year old female presenting with ongoing pain at her cervical and lumbar spine. As for her lower back pain, she has approval for a bilateral L4-5 transforaminal epidural injection for which we will facilitate in getting her scheduled. As for her neck pain, she failed a cervical epidural injection. She has ongoing severe numbness and tingling along with trouble grasping objects. I will have her see Dr. Morgan for possible surgical correction. For symptom control and to help with sleeping, I will prescribe Trazodone. Follow up in 6 weeks will be made for reassessment. \par \par \par Disability status:\par Temporary total disability. Patient is unable to return to work at this time.\par \par Entered by Wanda Vidal, acting as scribe for Dr. Moreno.\par  \par The documentation recorded by the scribe, in my presence, accurately reflects the service I personally performed, and the decisions made by me with my edits as appropriate.\par  \par Best Regards, \par Suresh Moreno MD \par Board Certified, Anesthesiology \par Board Certified, Pain Medicine\par \par

## 2023-03-08 ENCOUNTER — APPOINTMENT (OUTPATIENT)
Dept: ORTHOPEDIC SURGERY | Facility: CLINIC | Age: 33
End: 2023-03-08
Payer: OTHER MISCELLANEOUS

## 2023-03-08 ENCOUNTER — APPOINTMENT (OUTPATIENT)
Dept: ORTHOPEDIC SURGERY | Facility: CLINIC | Age: 33
End: 2023-03-08

## 2023-03-08 PROCEDURE — 99204 OFFICE O/P NEW MOD 45 MIN: CPT

## 2023-03-08 PROCEDURE — 99072 ADDL SUPL MATRL&STAF TM PHE: CPT

## 2023-03-08 NOTE — PHYSICAL EXAM
[de-identified] : TTP midline cervical spine and paraspinal musculature \par \par Strength                                                                    \par Deltoid\par   Right: 5/5; Left: 5/5                     \par Biceps\par   Right: 5/5; Left: 5/5                  \par Triceps     \par   Right: 5/5; Left: 5/5                                \par Wrist Extensors  \par   Right: 5/5; Left: 5/5\par Finger Flexors  \par   Right: 5/5; Left: 5/5\par IO \par   Right: 5/5; Left: 5/5\par \par Sensation\par C5\par   Right: 2/2; Left: 2/2\par C6\par   Right: 2/2; Left: 2/2\par C7\par   Right: 2/2; Left: 2/2\par C8\par   Right: 2/2; Left: 2/2\par T1\par   Right: 2/2; Left: 2/2\par \par Reflexes\par Biceps\par   Right: 2+; Left 2+\par Triceps\par   Right: 2+; Left 2+\par Mitchell's\par  Right: Negative; L: Negative\par

## 2023-03-08 NOTE — HISTORY OF PRESENT ILLNESS
[de-identified] : 32-year-old female presents to me with neck pain.  Radiates down both arms with burning in both of her hands.  This has been going on for over a year now however recently gotten worse.  This is from a work-related accident.  She states that her hands have been clumsy and she is dropping things.  She has also recent developed some balance issues.  Denies loss of bladder.  Denies any relief from physical therapy.  She has had multiple cervical epidural steroid injections however those did not really help her much anymore.  She is interested in other options.  Her MRI is from 2022.

## 2023-03-08 NOTE — DATA REVIEWED
[FreeTextEntry1] : I reviewed the patient's report from an MRI of her cervical spine a [facility] done in May 2022.  This states that she has a C4-5 disc herniation abutting the ventral spinal cord.

## 2023-03-08 NOTE — DISCUSSION/SUMMARY
[de-identified] : 32-year-old female with symptoms of cervical myeloradiculopathy.  I have advised the patient to bring in a CD of her MRI so I can evaluate the images myself prior to making any final recommendations regarding surgery.  She will bring in the CD and we will discuss after.

## 2023-03-09 ENCOUNTER — APPOINTMENT (OUTPATIENT)
Dept: PAIN MANAGEMENT | Facility: CLINIC | Age: 33
End: 2023-03-09

## 2023-03-27 ENCOUNTER — APPOINTMENT (OUTPATIENT)
Dept: OBGYN | Facility: CLINIC | Age: 33
End: 2023-03-27

## 2023-03-30 ENCOUNTER — APPOINTMENT (OUTPATIENT)
Dept: PAIN MANAGEMENT | Facility: CLINIC | Age: 33
End: 2023-03-30
Payer: OTHER MISCELLANEOUS

## 2023-03-30 ENCOUNTER — APPOINTMENT (OUTPATIENT)
Dept: PAIN MANAGEMENT | Facility: CLINIC | Age: 33
End: 2023-03-30

## 2023-03-30 PROCEDURE — 93040 RHYTHM ECG WITH REPORT: CPT | Mod: 59

## 2023-03-30 PROCEDURE — 72100 X-RAY EXAM L-S SPINE 2/3 VWS: CPT

## 2023-03-30 PROCEDURE — 64483 NJX AA&/STRD TFRM EPI L/S 1: CPT | Mod: RT

## 2023-03-30 PROCEDURE — 93770 DETERMINATION VENOUS PRESS: CPT

## 2023-03-30 PROCEDURE — 99152Z: CUSTOM

## 2023-03-30 NOTE — PROCEDURE
[FreeTextEntry1] : SELECTIVE TRANSFORAMINAL LUMBAR L4-5 EPIDURAL NERVE ROOT INJECTION UNDER FLUOROSCOPY [FreeTextEntry3] : SELECTIVE TRANSFORAMINAL LUMBAR L4-5 EPIDURAL NERVE ROOT INJECTION UNDER FLUOROSCOPY\par \par \par Date:  2023\par \par Patient: Zak Groves\par \par :  1990\par Preoperative Diagnosis: Lumbar Radiculopathy \par \par \par \par \par \par Procedure: \par 1. Selective Bilateral L4-5 Transforaminal Lumbar Epidural Nerve Root Injection under Fluoroscopy\par 2. Epidurography\par 3. Fluoroscopic guidance and localization of needle \par \par \par \par \par \par Physician: Suresh Moreno M.D.\par Anesthesia:  MAC/Cold spray, see nurses note. IV Sedation versed 2mg, fentanyl 100 mcg \par Anesthesiologist/CRNA: \par Medical Necessity:  Failure of conservative management.\par Consent:  Though unusual, the possible complications including infection, bleeding, nerve damage, hospital admission, stroke, pneumothorax, death or failure of the procedure are theoretically possible. The patient was educated about the of the procedure and alternative therapies. All questions were answered and the patient freely gave consent to proceed.\par Indication for Fluoroscopy:  This procedure requires the precise placement of the spinal needle into the epidural space.  It is the only way to accurately and safely perform the injection.\par \par \par \par \par PROCEDURE NOTE: \par After obtaining written consent, the patient was then positioned on the fluoroscopy table in the prone position with a pillow beneath the pelvis to reduce lumbar lordosis. The lumbar area was prepped with chloraprep solution and draped in the usual manner. A time out was performed. The fluoroscope was used to identify the L3///L4///L5 vertebral body on the AP projection. It was then rotated into an oblique projection until the superior articulating process of the L5 (inferior) vertebra is projected beneath the 6 o'clock position of the L4 (superior) vertebrae. The 22 gauge 3-1/2 inch needle was inserted in the skin at a point overlying the superior articulating process of the inferior vertebra and aimed for the 6 o'clock position of the superior vertebrae's pedicle.  After the needle contacted bone, a lateral projection was obtained to insure that the needle tip was in proximity with the vertebral body. Paresthesias were not noted.  One ml of Omnipaque 240 was injected and a neurogram was obtained. Following demonstration of the neurogram, 1 ml of Preservative free normal saline and 1 ml of dexamethasone (10mg) was injected. The small volume and relatively high concentration was chosen to preserve selectivity and diagnostic value of the injection. There was no CSF nor heme identified. The contralateral side was injected in identical fashion.\par \par \par Epidurogram: The nerve root was observed in its outline on the neurogram. Distal and proximal spread was noted.\par Findings: Lumbar Spine AP and oblique views with x-ray degenerative changes noted.\par \par Complications: none.  \par \par Disposition: I have examined the patient and there are no new physical findings since original presentation. The patient was discharged home with a . The discharge instruction sheet was given to the patient. Motor function was intact.\par \par Comment: 1st TFESI today, depending on effectiveness would schedule 2nd TFESI in 1-2 weeks vs caudal epidural steroid vs follow up in office. Call if any problems\par \par \par \par \par This document was signed by:\par Suresh Moreno MD  \par Board Certified, Anesthesiology  \par Board Certified, Pain Medicine  \par \par \par

## 2023-04-04 NOTE — ASSESSMENT
[FreeTextEntry1] : 32-year-old female with chief complaints of pain at multiple sites after work related injury. At this time,  continues with physical therapy for her neck and lower back,  however has been finding mild relief with it.  Given the persistent cervical and lumbar radicular pain, we did discuss epidural injections including a VIJI and a caudal epidural for which she is hesitant to proceed with at this time.  For pain control, we will send over prednisone 50 mg be taken daily for 5-7 days.  We will also renew her cyclobenzaprine 10 mg 1-2 tabs QHS. She will continue follow-up with orthopedics for the right knee. She will follow-up in 4 weeks for reassessment.\par \par \par Disability status:\par Temporary total disability. Patient is currently unable to return to work at this time\par \par 
Calm

## 2023-04-06 ENCOUNTER — APPOINTMENT (OUTPATIENT)
Dept: PAIN MANAGEMENT | Facility: CLINIC | Age: 33
End: 2023-04-06

## 2023-04-27 ENCOUNTER — APPOINTMENT (OUTPATIENT)
Dept: PAIN MANAGEMENT | Facility: CLINIC | Age: 33
End: 2023-04-27
Payer: OTHER MISCELLANEOUS

## 2023-04-27 VITALS — WEIGHT: 145 LBS | BODY MASS INDEX: 24.16 KG/M2 | HEIGHT: 65 IN

## 2023-04-27 PROCEDURE — 99215 OFFICE O/P EST HI 40 MIN: CPT

## 2023-04-27 NOTE — HISTORY OF PRESENT ILLNESS
[FreeTextEntry1] : HPI: Ms. Zak Groves, a 32-year-old female, presents today after she sustained a fall while she was at work on 03/24/2022 down a flight of stairs. She states she did not hit her head or lose any consciousness. After the fall, she states she developed neck, thoracic, lumbar, pelvic pain and chief complaints of new onset headaches. She states she did go to the urgent care was prescribed some medications which were not helpful. She states she also had x-rays of the entire spine which did not show any fractures.\par \par In regards to her neck pain, she states the pain happens constant in nature. She states the pain is severe and is currently rated as a 10/10 on the pain scale. She states the pain is associated with stiffness, spasms and trouble with rotation of the neck. She states the pain radiates into the right shoulder.\par \par In regards the thoracic spine, she states this pain is the worst segment. She currently rates the pain as a 10/10 on the pain scale. She states the pain is constant in nature. She states she has significant trouble with breathing. She states the pain travels into the bilateral thoracic paraspinal area and into the right side of the ribs. She states the pain is present daily.\par \par In regards to her lumbar spine pain, she states the pain is constant in nature. She states the pain is severe and currently rated as a 10/10 on the pain scale. She states she has significant trouble with standing, sitting or walking secondary to the pain. She states the pain occasionally radiates into the right buttock into the right hamstring with some numbness and tingling.\par \par She is also presenting with chief complaints of right-sided diffuse rib pain. She states she also has nonspecific buttock and pelvic pain.\par \par She also has chief complaints of dizziness since the accident. She states she also has headaches which she never had before this incident. She denies any red flags. She states she does get unsteady at times due to the dizziness.\par \par TODAY: Since last visit, she continues with pain at multiple sites. \par \par As for her neck pain, she continues with severe pain. She states the pain is in the neck with radiation into the upper extremities. She has ongoing severe numbness, tingling and spasms into the arms. She has trouble with her  strength. She states she is also dropping objects at times. She rates the pain at a 9/10 on the pain scale. She recently saw Dr. Morgan and surgery may be a option for the neck. She states her neck pain is now causing severe headaches that are present throughout the day. \par \par In regards the thoracic spine, she states this pain is the worst segment. She states the pain is constant in nature. She currently rates the pain as a 10/10 on the pain scale. She continues with significant trouble with breathing. She states the pain travels into the bilateral thoracic paraspinal area and into the right side of the ribs. \par \par As for her lower back pain, she is status post bilateral L4-5 transforaminal epidural steroid injection 3- with approximately 50% relief of her lumbar radicular pain for 3 days following this procedure. She noticed some improvement in pain and some increase in function. Today, she states her lower back pain is returning. She states the pain is in her back and radiates into the lower extremities. She notes associated numbness, tingling along with spasms in the legs. She has pain rated at a 8/10 on the pain scale. She states sitting for extended periods of time causes severe pain. \par \par She is currently utilizing Meloxicam and Motrin which is providing her with minimal relief.

## 2023-04-27 NOTE — ASSESSMENT
[FreeTextEntry1] : 33-year-old female presenting with chronic neck and lower back pain.  Her main complaint today is her severe flare-up of cervical pain.  Her prior cervical epidural steroid injection resulted in 75% relief for 1 month.  I will now proceed with a repeat cervical epidural steroid injection with sedation.  She is also contemplating surgery for the cervical spine.  I will also have her see Dr. Morgan to consultation for her back as well.  She will follow-up in 3 months for reassessment.\par \par Patient had a MRI that shows a radicular component along with pain referred into the upper extremity. Patient has trialed rehab (Home exercise, physical therapy or chiropractic care) and medications. I will schedule a C7-T1 cervical epidural steroid injection.\par \par Risk, benefits, pros and cons of procedure were explained to the patient using models and diagrams and their questions were answered. \par \par \par The patient has severe anxiety of procedures that necessitates monitored anesthesia care (MAC). The procedure performed will be close to major nerves, arteries, and spinal cord and/or joint structures. Due to the proximity of these structures, we need the patient to be still during the procedure.  With the help of MAC, this will be safely achieved and decrease the risk of any complications.\par \par \par Disability status:\par Temporary total disability. Patient is unable to return to work at this time.\par \par Entered by Wanda Vidal, acting as scribe for Dr. Moreno.\par  \par The documentation recorded by the scribe, in my presence, accurately reflects the service I personally performed, and the decisions made by me with my edits as appropriate.\par  \par Best Regards, \par Suresh Moreno MD \par Board Certified, Anesthesiology \par Board Certified, Pain Medicine\par \par

## 2023-04-27 NOTE — PHYSICAL EXAM
[de-identified] : Spine- tenderness into the thoracic/lumbar paraspinals. ROM restricted. Pain with flexion. Positive SLR bilaterally. \par \par Neck- tenderness into the cervical paraspinals. ROM restricted. Pain with flexion. Positive spurling bilaterally.

## 2023-05-03 ENCOUNTER — APPOINTMENT (OUTPATIENT)
Dept: ORTHOPEDIC SURGERY | Facility: CLINIC | Age: 33
End: 2023-05-03

## 2023-05-22 ENCOUNTER — FORM ENCOUNTER (OUTPATIENT)
Age: 33
End: 2023-05-22

## 2023-05-25 ENCOUNTER — APPOINTMENT (OUTPATIENT)
Dept: PAIN MANAGEMENT | Facility: CLINIC | Age: 33
End: 2023-05-25
Payer: OTHER MISCELLANEOUS

## 2023-05-25 VITALS — WEIGHT: 145 LBS | HEIGHT: 65 IN | BODY MASS INDEX: 24.16 KG/M2

## 2023-05-25 PROCEDURE — 99215 OFFICE O/P EST HI 40 MIN: CPT

## 2023-05-25 NOTE — ASSESSMENT
[FreeTextEntry1] : 33-year-old female presenting with chronic neck and lower back pain.  Her main complaint today is her severe flare-up of cervical pain.  Her prior cervical epidural steroid injection resulted in 75% relief for 1 month. Repeat cervical epidural steroid injection with sedation was denied.  Her neck pain is very severe and we feel cervical epidural steroid is necessary at this time. She is also contemplating surgery for the cervical spine.  I will also have her see Dr. Morgan for continued care for her back as well.  She will follow-up in 6 weeks for reassessment. I am also adding Gabapentin at this time. \par \par Patient had a MRI that shows a radicular component along with pain referred into the upper extremity. Patient has trialed rehab (Home exercise, physical therapy or chiropractic care) and medications. I will schedule a C7-T1 cervical epidural steroid injection.\par \par Risk, benefits, pros and cons of procedure were explained to the patient using models and diagrams and their questions were answered. \par \par \par The patient has severe anxiety of procedures that necessitates monitored anesthesia care (MAC). The procedure performed will be close to major nerves, arteries, and spinal cord and/or joint structures. Due to the proximity of these structures, we need the patient to be still during the procedure.  With the help of MAC, this will be safely achieved and decrease the risk of any complications.\par \par \par Disability status:\par Temporary total disability. Patient is unable to return to work at this time.\par \par Entered by Trever Enrique, acting as scribe for Dr. Moreno.\par  \par The documentation recorded by the scribe, in my presence, accurately reflects the service I personally performed, and the decisions made by me with my edits as appropriate.\par  \par Best Regards, \par Suresh Moreno MD \par Board Certified, Anesthesiology \par Board Certified, Pain Medicine\par \par

## 2023-05-25 NOTE — PHYSICAL EXAM
[de-identified] : Spine- tenderness into the thoracic/lumbar paraspinals. ROM restricted. Pain with flexion. Positive SLR bilaterally. \par \par Neck- tenderness into the cervical paraspinals. ROM restricted. Pain with flexion. Positive spurling bilaterally.

## 2023-06-19 ENCOUNTER — FORM ENCOUNTER (OUTPATIENT)
Age: 33
End: 2023-06-19

## 2023-07-20 ENCOUNTER — APPOINTMENT (OUTPATIENT)
Dept: PAIN MANAGEMENT | Facility: CLINIC | Age: 33
End: 2023-07-20
Payer: OTHER MISCELLANEOUS

## 2023-07-20 VITALS — WEIGHT: 145 LBS | HEIGHT: 65 IN | BODY MASS INDEX: 24.16 KG/M2

## 2023-07-20 DIAGNOSIS — M51.26 OTHER INTERVERTEBRAL DISC DISPLACEMENT, LUMBAR REGION: ICD-10-CM

## 2023-07-20 DIAGNOSIS — M54.12 RADICULOPATHY, CERVICAL REGION: ICD-10-CM

## 2023-07-20 DIAGNOSIS — M51.16 INTERVERTEBRAL DISC DISORDERS WITH RADICULOPATHY, LUMBAR REGION: ICD-10-CM

## 2023-07-20 DIAGNOSIS — M54.2 CERVICALGIA: ICD-10-CM

## 2023-07-20 DIAGNOSIS — M54.50 LOW BACK PAIN, UNSPECIFIED: ICD-10-CM

## 2023-07-20 DIAGNOSIS — M79.18 MYALGIA, OTHER SITE: ICD-10-CM

## 2023-07-20 PROCEDURE — 99214 OFFICE O/P EST MOD 30 MIN: CPT | Mod: ACP

## 2023-07-20 RX ORDER — DULOXETINE HYDROCHLORIDE 30 MG/1
30 CAPSULE, DELAYED RELEASE PELLETS ORAL
Qty: 30 | Refills: 0 | Status: ACTIVE | COMMUNITY
Start: 2023-05-25 | End: 1900-01-01

## 2023-07-20 NOTE — PHYSICAL EXAM
[de-identified] : Spine- tenderness into the thoracic/lumbar paraspinals. ROM restricted. Pain with flexion. Positive SLR bilaterally. \par \par Neck- tenderness into the cervical paraspinals. ROM restricted. Pain with flexion. Positive spurling bilaterally.  [] : diminished ROM in all planes [Flexion] : flexion [Extension] : extension [Rotation to left] : rotation to left [Rotation to right] : rotation to right

## 2023-07-20 NOTE — ASSESSMENT
[FreeTextEntry1] : 33-year-old female presenting with chronic neck and lower back pain. She continues with severe cervical pain that manifests with severe stiffness and spasms. There is radiation to the cervical paraspinals. Multiple palpable trigger points were appreciated on physical exam. We will submit for cervical TPIs for further relief. She is also contemplating surgery for the cervical spine. As for her lower back pain, she is status post bilateral L4-5 transforaminal epidural steroid injection 3/30/2023 with excellent relief. Unfortunately, her pain has returned to baseline. We will submit for a second b/l L4-L5 SNRI w/MAC and she is agreeable.  She will continue with Duloxetine 30 mg. She will follow-up in 4 weeks for reassessment. \par \par Patient had a MRI that shows a radicular component along with pain referred into the lower extremity. Patient has trialed rehab (Home exercise, physical therapy or chiropractic care) and medications I will schedule a b/l L4-L5 SNRI 1-3 depending on effectiveness.\par \par Risk, benefits, pros and cons of procedure were explained to the patient using models and diagrams and their questions were answered. \par \par The patient has severe anxiety of procedures that necessitates monitored anesthesia care (MAC). The procedure performed will be close to major nerves, arteries, and spinal cord and/or joint structures. Due to the proximity of these structures, we need the patient to be still during the procedure.  With the help of MAC, this will be safely achieved and decrease the risk of any complications.\par \par Disability status:\par Temporary total disability. Patient is unable to return to work at this time.\par \par Santy Anderson PA-C\par Suresh Moreno MD\par

## 2023-07-20 NOTE — HISTORY OF PRESENT ILLNESS
[FreeTextEntry1] : HPI: Ms. Zak Groves, a 32-year-old female, presents today after she sustained a fall while she was at work on 03/24/2022 down a flight of stairs. She states she did not hit her head or lose any consciousness. After the fall, she states she developed neck, thoracic, lumbar, pelvic pain and chief complaints of new onset headaches. She states she did go to the urgent care was prescribed some medications which were not helpful. She states she also had x-rays of the entire spine which did not show any fractures.\par \par In regards to her neck pain, she states the pain happens constant in nature. She states the pain is severe and is currently rated as a 10/10 on the pain scale. She states the pain is associated with stiffness, spasms and trouble with rotation of the neck. She states the pain radiates into the right shoulder.\par \par In regards the thoracic spine, she states this pain is the worst segment. She currently rates the pain as a 10/10 on the pain scale. She states the pain is constant in nature. She states she has significant trouble with breathing. She states the pain travels into the bilateral thoracic paraspinal area and into the right side of the ribs. She states the pain is present daily.\par \par In regards to her lumbar spine pain, she states the pain is constant in nature. She states the pain is severe and currently rated as a 10/10 on the pain scale. She states she has significant trouble with standing, sitting or walking secondary to the pain. She states the pain occasionally radiates into the right buttock into the right hamstring with some numbness and tingling.\par \par She is also presenting with chief complaints of right-sided diffuse rib pain. She states she also has nonspecific buttock and pelvic pain.\par \par She also has chief complaints of dizziness since the accident. She states she also has headaches which she never had before this incident. She denies any red flags. She states she does get unsteady at times due to the dizziness.\par \par TODAY: Since last visit, she continues with pain at multiple sites. \par \par As for her neck pain, she continues with severe pain. She states the pain is in the neck with radiation into the upper extremities. She has ongoing severe numbness, tingling and spasms into the arms. She has trouble with her  strength. She states she is also dropping objects at times. She rates the pain at a 9/10 on the pain scale. She recently saw Dr. Morgan and surgery may be a option for the neck. She states her neck pain is now causing severe headaches that are present throughout the day. Repeat epidural was denied. She also notes severe spasms and stiffness which we discussed targeting with Cervical TPIs and she is agreeable.\par \par As for her thoracic spine, the pain is constant and travels into the bilateral thoracic paraspinal area and into the right side of the ribs. \par \par As for her lower back pain, she is status post bilateral L4-5 transforaminal epidural steroid injection 3/30/2023 with excellent relief. Overall, her pain decreased in regards to intensity and duration. Unfortunately, her pain has returned to baseline and is presenting with severe radiation of pain into the lower extremities. There is associated numbness, tingling along with spasms in the legs. We discussed trialing a second b/l L4-L5 SNRI w/MAC and she is agreeable.\par \par Of note, she is prescribed Duloxetine 30 mg but has not yet started taking the medication.

## 2023-08-31 ENCOUNTER — APPOINTMENT (OUTPATIENT)
Dept: PAIN MANAGEMENT | Facility: CLINIC | Age: 33
End: 2023-08-31

## 2023-12-10 NOTE — ED ADULT TRIAGE NOTE - PATIENT ON (OXYGEN DELIVERY METHOD)
12/10/23 1220   Discharge Planning   Living Arrangements Spouse/significant other   Support Systems Spouse/significant other   Assistance Needed walker   Type of Residence Private residence   Patient expects to be discharged to: pending progress       12/10/2023   Met with pt in room, introduced self and explained role. Verified insurance, address, phone.  PCP- Triston Samson  Pharmacy- [facility] on [address] in [place]  Pt is from home, lives with wife. Stated he uses a walker. Home is one level.  1 step to get into the home- with a rail. Wife helps pt with bathing.  Grab bars and a shower seat in bathroom. Pt and wife care for the home.  Pt stated he does the driving.    Stated He has has a liu for the past several months and feels comfortable caring for it/emptying it.  Ct Team will continue to follow for needs.    Leana Stuart RN [facility]   room air

## 2024-01-12 NOTE — HISTORY OF PRESENT ILLNESS
[Dear  ___] : Dear  [unfilled], [Consult Letter:] : I had the pleasure of evaluating your patient, [unfilled]. [FreeTextEntry1] : HPI: Ms. Zak Groves, a 32-year-old female, presents today after she sustained a fall while she was at work on 03/24/2022 down a flight of stairs. She states she did not hit her head or lose any consciousness. After the fall, she states she developed neck, thoracic, lumbar, pelvic pain and chief complaints of new onset headaches. She states she did go to the urgent care was prescribed some medications which were not helpful. She states she also had x-rays of the entire spine which did not show any fractures.\par \par In regards to her neck pain, she states the pain happens constant in nature. She states the pain is severe and is currently rated as a 10/10 on the pain scale. She states the pain is associated with stiffness, spasms and trouble with rotation of the neck. She states the pain radiates into the right shoulder.\par \par In regards the thoracic spine, she states this pain is the worst segment. She currently rates the pain as a 10/10 on the pain scale. She states the pain is constant in nature. She states she has significant trouble with breathing. She states the pain travels into the bilateral thoracic paraspinal area and into the right side of the ribs. She states the pain is present daily.\par \par In regards to her lumbar spine pain, she states the pain is constant in nature. She states the pain is severe and currently rated as a 10/10 on the pain scale. She states she has significant trouble with standing, sitting or walking secondary to the pain. She states the pain occasionally radiates into the right buttock into the right hamstring with some numbness and tingling.\par \par She is also presenting with chief complaints of right-sided diffuse rib pain. She states she also has nonspecific buttock and pelvic pain.\par \par She also has chief complaints of dizziness since the accident. She states she also has headaches which she never had before this incident. She denies any red flags. She states she does get unsteady at times due to the dizziness.\par \par TODAY: Since last visit, she continues with pain at multiple sites. \par \par As for her neck pain, she continues with severe pain. She states the pain is in the neck with radiation into the upper extremities. She has ongoing severe numbness, tingling and spasms into the arms. She has trouble with her  strength. She states she is also dropping objects at times. She rates the pain at a 9/10 on the pain scale. She recently saw Dr. Morgan and surgery may be a option for the neck. She states her neck pain is now causing severe headaches that are present throughout the day. Repeat epidural was denied. \par \par In regards the thoracic spine, she states this pain is the worst segment. She states the pain is constant in nature. She currently rates the pain as a 10/10 on the pain scale. She continues with significant trouble with breathing. She states the pain travels into the bilateral thoracic paraspinal area and into the right side of the ribs. \par \par As for her lower back pain, she is status post bilateral L4-5 transforaminal epidural steroid injection 3- with approximately 50% relief of her lumbar radicular pain for 3 days following this procedure. She noticed some improvement in pain and some increase in function. Today, she states her lower back pain is returning. She states the pain is in her back and radiates into the lower extremities. She notes associated numbness, tingling along with spasms in the legs. She has pain rated at a 8/10 on the pain scale. She states sitting for extended periods of time causes severe pain. \par \par She is currently utilizing Meloxicam and Motrin which is providing her with minimal relief. She has seen Dr. Koch who is requesting MRI's of the cervical and lumbar spine. She is still pending authorization.  [Please see my note below.] : Please see my note below. [Consult Closing:] : Thank you very much for allowing me to participate in the care of this patient.  If you have any questions, please do not hesitate to contact me. [Sincerely,] : Sincerely, [FreeTextEntry3] : Valente Patel M.D. Shoulder Surgery

## 2024-03-12 ENCOUNTER — APPOINTMENT (OUTPATIENT)
Dept: OBGYN | Facility: CLINIC | Age: 34
End: 2024-03-12
Payer: COMMERCIAL

## 2024-03-12 VITALS
DIASTOLIC BLOOD PRESSURE: 80 MMHG | SYSTOLIC BLOOD PRESSURE: 120 MMHG | BODY MASS INDEX: 24.66 KG/M2 | HEIGHT: 65 IN | WEIGHT: 148 LBS

## 2024-03-12 DIAGNOSIS — Z32.02 ENCOUNTER FOR PREGNANCY TEST, RESULT NEGATIVE: ICD-10-CM

## 2024-03-12 DIAGNOSIS — Z01.411 ENCOUNTER FOR GYNECOLOGICAL EXAMINATION (GENERAL) (ROUTINE) WITH ABNORMAL FINDINGS: ICD-10-CM

## 2024-03-12 DIAGNOSIS — R68.82 DECREASED LIBIDO: ICD-10-CM

## 2024-03-12 LAB — HCG UR QL: NEGATIVE

## 2024-03-12 PROCEDURE — 99395 PREV VISIT EST AGE 18-39: CPT | Mod: 25

## 2024-03-12 PROCEDURE — 81025 URINE PREGNANCY TEST: CPT

## 2024-03-12 PROCEDURE — 99213 OFFICE O/P EST LOW 20 MIN: CPT | Mod: 25

## 2024-03-12 NOTE — PHYSICAL EXAM
[Chaperone Present] : A chaperone was present in the examining room during all aspects of the physical examination [Appropriately responsive] : appropriately responsive [FreeTextEntry1] : Roslyn [Alert] : alert [No Acute Distress] : no acute distress [No Lymphadenopathy] : no lymphadenopathy [Regular Rate Rhythm] : regular rate rhythm [No Murmurs] : no murmurs [Clear to Auscultation B/L] : clear to auscultation bilaterally [Soft] : soft [Non-tender] : non-tender [Non-distended] : non-distended [No HSM] : No HSM [No Mass] : no mass [No Lesions] : no lesions [Oriented x3] : oriented x3 [Examination Of The Breasts] : a normal appearance [] : implants [No Masses] : no breast masses were palpable [Labia Majora] : normal [Labia Minora] : normal [Normal] : normal [Uterine Adnexae] : normal

## 2024-03-12 NOTE — DISCUSSION/SUMMARY
[FreeTextEntry1] : Pap done Self breast exam stressed Prescribed hormone profile Prescribed pelvic ultrasound Coital timing discussed Keep menstrual calendar Advised follow-up with reproductive endocrinologist if no conception in 6 months

## 2024-03-12 NOTE — HISTORY OF PRESENT ILLNESS
[FreeTextEntry1] : Patient is 33 years old para 1-0-3-1 last menstrual period February 18, 2024. Patient states that she notes irregular menses since December 2023.  She states that she has a history of 28-day cycles. Patient states that she has been trying to conceive x 3 months. Patient is requesting hormonal evaluation and states that she has decreased libido.

## 2024-05-03 ENCOUNTER — APPOINTMENT (OUTPATIENT)
Dept: OBGYN | Facility: CLINIC | Age: 34
End: 2024-05-03
Payer: COMMERCIAL

## 2024-05-03 VITALS
SYSTOLIC BLOOD PRESSURE: 100 MMHG | BODY MASS INDEX: 24.49 KG/M2 | DIASTOLIC BLOOD PRESSURE: 60 MMHG | WEIGHT: 147 LBS | HEIGHT: 65 IN

## 2024-05-03 DIAGNOSIS — Z32.01 ENCOUNTER FOR PREGNANCY TEST, RESULT POSITIVE: ICD-10-CM

## 2024-05-03 DIAGNOSIS — N92.6 IRREGULAR MENSTRUATION, UNSPECIFIED: ICD-10-CM

## 2024-05-03 DIAGNOSIS — N83.201 UNSPECIFIED OVARIAN CYST, RIGHT SIDE: ICD-10-CM

## 2024-05-03 LAB — HCG UR QL: POSITIVE

## 2024-05-03 PROCEDURE — 76830 TRANSVAGINAL US NON-OB: CPT

## 2024-05-03 PROCEDURE — 99214 OFFICE O/P EST MOD 30 MIN: CPT | Mod: 25

## 2024-05-03 PROCEDURE — 99459 PELVIC EXAMINATION: CPT

## 2024-05-03 NOTE — DISCUSSION/SUMMARY
[FreeTextEntry1] : Continue prenatal vitamins Dietary advice Follow-up for prenatal care and delivery

## 2024-05-03 NOTE — HISTORY OF PRESENT ILLNESS
[FreeTextEntry1] : Patient is 34 years old para 1-0-3-1 last menstrual period March 20, 2024 She noted a positive pregnancy test at home.  Pregnancy test in office is noted to be positive She denies pain or bleeding.  Patient has noted nausea without vomiting

## 2024-05-03 NOTE — PROCEDURE
[Amenorrhea] : Amenorrhea [Transvaginal Ultrasound] : transvaginal ultrasound [Anteverted] : anteverted [No Fibroid(s)] : no fibroid(s) [L: ___ cm] : L: [unfilled] cm [H: ___ cm] : H: [unfilled] cm [FreeTextEntry5] : Crown-rump length 0.64 cm, gestational age 6 weeks 3 days, positive fetal heart motion noted [FreeTextEntry7] : 4.6 x 5.3 cm, right ovarian cyst simple appearing 4.4 x 3.9 cm [FreeTextEntry8] : 2.4 x 3.6 cm [FreeTextEntry4] : Intrauterine pregnancy noted, gestational age 6 weeks 3 days

## 2024-05-20 ENCOUNTER — APPOINTMENT (OUTPATIENT)
Dept: OBGYN | Facility: CLINIC | Age: 34
End: 2024-05-20
Payer: COMMERCIAL

## 2024-05-20 VITALS — WEIGHT: 147 LBS | TEMPERATURE: 97 F | HEIGHT: 65 IN | BODY MASS INDEX: 24.49 KG/M2

## 2024-05-20 DIAGNOSIS — K59.00 CONSTIPATION, UNSPECIFIED: ICD-10-CM

## 2024-05-20 DIAGNOSIS — N91.2 AMENORRHEA, UNSPECIFIED: ICD-10-CM

## 2024-05-20 DIAGNOSIS — N94.10 UNSPECIFIED DYSPAREUNIA: ICD-10-CM

## 2024-05-20 DIAGNOSIS — K21.9 GASTRO-ESOPHAGEAL REFLUX DISEASE W/OUT ESOPHAGITIS: ICD-10-CM

## 2024-05-20 PROCEDURE — 99203 OFFICE O/P NEW LOW 30 MIN: CPT | Mod: 25

## 2024-05-20 PROCEDURE — 76830 TRANSVAGINAL US NON-OB: CPT

## 2024-05-24 PROBLEM — N91.2 AMENORRHEA: Status: ACTIVE | Noted: 2024-05-03

## 2024-05-24 PROBLEM — K59.00 CONSTIPATION IN FEMALE: Status: ACTIVE | Noted: 2024-05-24

## 2024-05-24 PROBLEM — N94.10 DYSPAREUNIA IN FEMALE: Status: ACTIVE | Noted: 2024-05-24

## 2024-05-24 PROBLEM — K21.9 ACID REFLUX: Status: ACTIVE | Noted: 2024-05-24

## 2024-06-06 ENCOUNTER — APPOINTMENT (OUTPATIENT)
Dept: OBGYN | Facility: CLINIC | Age: 34
End: 2024-06-06
Payer: COMMERCIAL

## 2024-06-06 VITALS — HEIGHT: 65 IN | WEIGHT: 147 LBS | BODY MASS INDEX: 24.49 KG/M2

## 2024-06-06 PROCEDURE — 76830 TRANSVAGINAL US NON-OB: CPT

## 2024-06-06 PROCEDURE — 99213 OFFICE O/P EST LOW 20 MIN: CPT | Mod: 25

## 2024-06-06 NOTE — DISCUSSION/SUMMARY
[FreeTextEntry1] : 34 YEAR OLD FEMALE G 4 P 1 AB 3 ( 2 MISC -TWIN PREG - AND 1 ECTOPIC) LMP 3/20/2024 , ON TIME AND NORMAL FLOW ,M WITH CYCLES GENERALLY Q 29 X 5, FMP 13, PRESENTS FOR EVALUATION OF PREGNANCY AND TO START PRENATAL CARE. PT HAS BEEN SEEING DR. RYDER AND MOST RECENTLY WAS SEEN 2WKS AGO AND AT THAT TIME AN IUP WITH FETAL HEART ACTIVITY WAS NOTED ON TV US.  FAMILY HISTORY OF MAT. AUNT WITH CHILD WITH AUTISM; PT HAS A 1/2 SIBLING WITH AUTISM.  NO HISTORY OF MULTIPLE BIRTHS IN EITHER FAMILY; PET DOG. PAST MEDICAL HISOTRY; PT HAS A DEVIATED SEPTUM PAST SURGICAL HISTORY; LAPAROSCOPY WITH LEFT SALPINGECTOMY FOR ECTOPIC                                                BREAST AUGMENTATION- IMPLANTS                                                 "TUMMY TUCK" PAST OBSTETRICAL HISTORY; 3/2012;  BABY GIRL 7'7" INDUCED AT 37 WKS [place]-                                 UNEVENTFUL PREGNANCY AND DELIVERY. MEDICATIONS; PRENATAL VITAMINS; OMEPRAZOLE FOR ACID REFLUX; SINGULAR FOR                                  TROUBLE BREATHING MEDICATION ALLERGIES; NITROFURANTOIN - ERYTHEMA MULTIFORM                                                                                  PENICILLIN AS A KID ROS;BMS-CONSTIPATION - TAKES MAGN. CIT.; NO FM HIATOYR OF COLON CANCER          NO URINARY COMPLAINTS EXCEPT FOR FREQUENCY          SEXUALLY ACTIVE WITH POSITIONAL DISCOMFORT AT TIMES BREASTS; DOES BREAST SELF EXAMINATION                    HAS HAD BASELINE MAMMOGRAPHY SOME YEARS AGO                    NO FAM HISTORY OF BREAST CANCER +EXERCISES 6 X/WK; + MULTIVITAMINS; + DAIRY/ YOGURT; - TOBACCO OR VAPING FAMILY HISTOYR; PAT. AND MAT.  GRANDMOTHER WITH HYPERTENSION                                 MOTHER HAD LYMPOMA  (ON 2024 PT RELATED MOTHER HAD VULVAR CA)                                FATHER HAD DIABETES AND  OF AN  MI  PE;/62; TEMP 97.0;WEIGHT 147 LBS HEIGHT 5'5"      HEENT; WNL      NECK SUPPLE, THYROID NORMAL      CHEST; SYMMETRICAL      LUNGS; CLEAR TO A AND P      HEART; REGULAR RHYTM , NO MURMURS      BREASTS; NO MASSES OR DISCHARGES; IMPLANTS BILATERALLY       ABDOMEN;SOFT, NO MASSES; NO TENDERNESS TO PALPATION; ABDOMINOPLASTY SCAR      PELVIC NORMAL EXTERNAL GENTILIA                    NORMAL URETHRAL MEATUS                    NORMAL VAGINA WITHOUT LESION                    NORMAL CERVIX-NO LESION                    3RD DEGREE RETROVERTED UERUS                    NO PALPABLE ADNEXAL MASSES  TV US PERFORMED DEMONSTRATING A GESTATIONAL SAC WITHIN THE UTERUS WTIH A YOLK SAC AND WELL DEFINED FETAL POLE MEASURING 8 WKS CRL WITH AN EDC OF 2024 ( 2024 BY LMP) WITH FETAL HEART ACTIVIY  BPM.  RT OVARY SEEN AND WTIH A SIMPLE CYST CONSISTENT WITH A C.LUTEAL CYST WITHING. LEFT OVARY DIFFICULT TO VISUALIZE.  ADNEXA NEGAITVE AND NO FREE FLUIS IN CUL DE SAC.  IMP; SECONDARY AMENORRHEA          DYSPAREUNIA- POSITIONAL          ACID REFLUX          IUP 8WKS-- AMA          CONSTIPATION  PLAN; RETURN TO OFFICE IN 3 WKS FOR SONO WITH NT AND  WITLL THEN SEND FOR ROUTINE                     PRENATAL BLOOD WORK, NIPT ANEUPLOIDY TESTING AND HEREDITARY CARRIER                      TESTING.            CONTINUE PRENATAL VITAMINS.

## 2024-06-20 ENCOUNTER — LABORATORY RESULT (OUTPATIENT)
Age: 34
End: 2024-06-20

## 2024-06-20 ENCOUNTER — ASOB RESULT (OUTPATIENT)
Age: 34
End: 2024-06-20

## 2024-06-20 ENCOUNTER — APPOINTMENT (OUTPATIENT)
Dept: OBGYN | Facility: CLINIC | Age: 34
End: 2024-06-20
Payer: COMMERCIAL

## 2024-06-20 VITALS — BODY MASS INDEX: 24.16 KG/M2 | WEIGHT: 145 LBS | TEMPERATURE: 98 F | HEIGHT: 65 IN

## 2024-06-20 PROCEDURE — 99213 OFFICE O/P EST LOW 20 MIN: CPT

## 2024-06-20 PROCEDURE — 76813 OB US NUCHAL MEAS 1 GEST: CPT | Mod: 26

## 2024-06-20 PROCEDURE — 76813 OB US NUCHAL MEAS 1 GEST: CPT | Mod: TC

## 2024-06-24 ENCOUNTER — NON-APPOINTMENT (OUTPATIENT)
Age: 34
End: 2024-06-24

## 2024-06-27 ENCOUNTER — NON-APPOINTMENT (OUTPATIENT)
Age: 34
End: 2024-06-27

## 2024-07-10 ENCOUNTER — APPOINTMENT (OUTPATIENT)
Dept: OBGYN | Facility: CLINIC | Age: 34
End: 2024-07-10
Payer: COMMERCIAL

## 2024-07-10 ENCOUNTER — LABORATORY RESULT (OUTPATIENT)
Age: 34
End: 2024-07-10

## 2024-07-10 VITALS
SYSTOLIC BLOOD PRESSURE: 108 MMHG | DIASTOLIC BLOOD PRESSURE: 62 MMHG | WEIGHT: 148 LBS | HEIGHT: 65 IN | BODY MASS INDEX: 24.66 KG/M2

## 2024-07-10 DIAGNOSIS — Z3A.16 16 WEEKS GESTATION OF PREGNANCY: ICD-10-CM

## 2024-07-10 PROCEDURE — 81003 URINALYSIS AUTO W/O SCOPE: CPT | Mod: QW

## 2024-07-10 PROCEDURE — 99213 OFFICE O/P EST LOW 20 MIN: CPT | Mod: 25

## 2024-07-12 LAB
BILIRUB UR QL STRIP: NORMAL
GLUCOSE UR-MCNC: NORMAL
HCG UR QL: 0.1 EU/DL
HGB UR QL STRIP.AUTO: NORMAL
KETONES UR-MCNC: NORMAL
LEUKOCYTE ESTERASE UR QL STRIP: NORMAL
NITRITE UR QL STRIP: NORMAL
PH UR STRIP: 5
PROT UR STRIP-MCNC: NORMAL
SP GR UR STRIP: 1025

## 2024-07-15 ENCOUNTER — NON-APPOINTMENT (OUTPATIENT)
Age: 34
End: 2024-07-15

## 2024-07-25 ENCOUNTER — NON-APPOINTMENT (OUTPATIENT)
Age: 34
End: 2024-07-25

## 2024-08-06 ENCOUNTER — ASOB RESULT (OUTPATIENT)
Age: 34
End: 2024-08-06

## 2024-08-06 ENCOUNTER — APPOINTMENT (OUTPATIENT)
Dept: OBGYN | Facility: CLINIC | Age: 34
End: 2024-08-06

## 2024-08-06 PROCEDURE — 76811 OB US DETAILED SNGL FETUS: CPT | Mod: TC

## 2024-08-06 PROCEDURE — 76817 TRANSVAGINAL US OBSTETRIC: CPT | Mod: 26

## 2024-08-06 PROCEDURE — 76817 TRANSVAGINAL US OBSTETRIC: CPT | Mod: TC

## 2024-08-06 PROCEDURE — 76811 OB US DETAILED SNGL FETUS: CPT | Mod: 26

## 2024-08-06 PROCEDURE — 99213 OFFICE O/P EST LOW 20 MIN: CPT | Mod: 25

## 2024-08-08 ENCOUNTER — NON-APPOINTMENT (OUTPATIENT)
Age: 34
End: 2024-08-08

## 2024-08-28 ENCOUNTER — APPOINTMENT (OUTPATIENT)
Dept: OBGYN | Facility: CLINIC | Age: 34
End: 2024-08-28
Payer: COMMERCIAL

## 2024-08-28 VITALS
DIASTOLIC BLOOD PRESSURE: 68 MMHG | WEIGHT: 153 LBS | SYSTOLIC BLOOD PRESSURE: 115 MMHG | BODY MASS INDEX: 25.49 KG/M2 | HEIGHT: 65 IN

## 2024-08-28 DIAGNOSIS — Z3A.23 23 WEEKS GESTATION OF PREGNANCY: ICD-10-CM

## 2024-08-28 DIAGNOSIS — D64.9 ANEMIA, UNSPECIFIED: ICD-10-CM

## 2024-08-28 LAB
APPEARANCE: CLEAR
BILIRUBIN URINE: NEGATIVE
BLOOD URINE: NEGATIVE
COLOR: YELLOW
GLUCOSE QUALITATIVE U: NEGATIVE
KETONES URINE: ABNORMAL
LEUKOCYTE ESTERASE URINE: NEGATIVE
NITRITE URINE: NEGATIVE
PH URINE: 7
PROTEIN URINE: NEGATIVE
SPECIFIC GRAVITY URINE: 1.02
UROBILINOGEN URINE: 0.2 (ref 0.2–?)

## 2024-08-28 PROCEDURE — 99213 OFFICE O/P EST LOW 20 MIN: CPT

## 2024-08-30 ENCOUNTER — OUTPATIENT (OUTPATIENT)
Dept: INPATIENT UNIT | Facility: HOSPITAL | Age: 34
LOS: 1 days | Discharge: HOME | End: 2024-08-30
Payer: COMMERCIAL

## 2024-08-30 VITALS
DIASTOLIC BLOOD PRESSURE: 59 MMHG | HEART RATE: 56 BPM | SYSTOLIC BLOOD PRESSURE: 110 MMHG | TEMPERATURE: 99 F | RESPIRATION RATE: 16 BRPM

## 2024-08-30 VITALS — SYSTOLIC BLOOD PRESSURE: 110 MMHG | DIASTOLIC BLOOD PRESSURE: 59 MMHG | HEART RATE: 56 BPM

## 2024-08-30 DIAGNOSIS — Z98.890 OTHER SPECIFIED POSTPROCEDURAL STATES: Chronic | ICD-10-CM

## 2024-08-30 DIAGNOSIS — O26.899 OTHER SPECIFIED PREGNANCY RELATED CONDITIONS, UNSPECIFIED TRIMESTER: ICD-10-CM

## 2024-08-30 LAB
APPEARANCE UR: CLEAR — SIGNIFICANT CHANGE UP
BACTERIA # UR AUTO: NEGATIVE /HPF — SIGNIFICANT CHANGE UP
BILIRUB UR-MCNC: NEGATIVE — SIGNIFICANT CHANGE UP
CAST: 0 /LPF — SIGNIFICANT CHANGE UP (ref 0–4)
COLOR SPEC: YELLOW — SIGNIFICANT CHANGE UP
DIFF PNL FLD: NEGATIVE — SIGNIFICANT CHANGE UP
GLUCOSE UR QL: NEGATIVE MG/DL — SIGNIFICANT CHANGE UP
KETONES UR-MCNC: NEGATIVE MG/DL — SIGNIFICANT CHANGE UP
LEUKOCYTE ESTERASE UR-ACNC: ABNORMAL
NITRITE UR-MCNC: NEGATIVE — SIGNIFICANT CHANGE UP
PH UR: 7.5 — SIGNIFICANT CHANGE UP (ref 5–8)
PROT UR-MCNC: NEGATIVE MG/DL — SIGNIFICANT CHANGE UP
RBC CASTS # UR COMP ASSIST: 1 /HPF — SIGNIFICANT CHANGE UP (ref 0–4)
SP GR SPEC: 1.01 — SIGNIFICANT CHANGE UP (ref 1–1.03)
SQUAMOUS # UR AUTO: 4 /HPF — SIGNIFICANT CHANGE UP (ref 0–5)
UROBILINOGEN FLD QL: 0.2 MG/DL — SIGNIFICANT CHANGE UP (ref 0.2–1)
WBC UR QL: 10 /HPF — HIGH (ref 0–5)

## 2024-08-30 PROCEDURE — 99214 OFFICE O/P EST MOD 30 MIN: CPT

## 2024-08-30 PROCEDURE — 81001 URINALYSIS AUTO W/SCOPE: CPT

## 2024-08-30 PROCEDURE — 59025 FETAL NON-STRESS TEST: CPT

## 2024-08-30 PROCEDURE — 87086 URINE CULTURE/COLONY COUNT: CPT

## 2024-08-30 NOTE — OB PROVIDER TRIAGE NOTE - NSHPLABSRESULTS_GEN_ALL_CORE
Date: 6/20/2024  Blood type: A pos  HBsAg: negative  HepC: negative  Rubella: Equivocal  Measles: Immune  RPR: negative  HIV: neg  Varicella: Immune  Pap smear: NILM    SONOGRAMS:   8/6/2024: 19w6d  gms, ant placenta, no previa, LAZ 5.02 cm, CL 4.53 cm, no major fetal malformations  6/20/2024: 13w1d: post placenta, NT 2.323 mm

## 2024-08-30 NOTE — OB PROVIDER TRIAGE NOTE - NSOBPROVIDERNOTE_OBGYN_ALL_OB_FT
- will follow up on urine culture  - discharge home  - PO hydration recommended  -  labor precautions given  - kick count instructions discussed  - follow up with PMD as scheduled 34y  @ 23w2d, AMA, GBS unknown, possible UTI.    - will follow up on urine culture  - discharge home  - PO hydration recommended  -  labor precautions given  - kick count instructions discussed  - follow up with PMD as scheduled    Dr Soriano aware

## 2024-08-30 NOTE — OB PROVIDER TRIAGE NOTE - NSHPPHYSICALEXAM_GEN_ALL_CORE
T(C): 37 (08-30-24 @ 14:27), Max: 37 (08-30-24 @ 14:27)  HR: 56 (08-30-24 @ 14:27) (56 - 56)  BP: 110/59 (08-30-24 @ 14:27) (110/59 - 110/59)  RR: 16 (08-30-24 @ 14:27) (16 - 16)    EFM: 150 bpm, moderate variability, +accels  TOCO: none    Abd: soft, gravid, nontender, no guarding, no rebound tenderness

## 2024-08-30 NOTE — OB PROVIDER TRIAGE NOTE - HISTORY OF PRESENT ILLNESS
34y  @ 23w2d by first trimester sonogram, LUCIA 2024, AMA, presents for suprapubic pain. Patient states she was walking on the treadmill when pain occurred. Radiated to her thighs and lower back. She states she has not experienced this pain the past. Pain is intermittent and sharp sensation. Patient also states she has increased frequency. Denies HA, fever, blurry vision, chest pain, SOB, constipation, diarrhea, nausea, vomiting, dysuria, hematuria, leg pain, and abnormal swelling of the hands and feet. Denies VB, LOF, and ctx. +FM. No complications during this pregnancy.    Last sexual intercourse was 2 days ago.  Last bowel movement was this morning.

## 2024-08-30 NOTE — OB PROVIDER TRIAGE NOTE - ADDITIONAL INSTRUCTIONS
- will follow up on urine culture  - discharge home  - PO hydration recommended  -  labor precautions given  - kick count instructions discussed  - follow up with PMD as scheduled

## 2024-08-30 NOTE — OB PROVIDER TRIAGE NOTE - NS_OBGYNHISTORY_OBGYN_ALL_OB_FT
OB Hx:  x 1. Largest 7-7               SAB x 1. D&C x 1             ETOP x 1               Ectopic x 1 with salpingectomy    G1 3/1/2012 FT  F 7-7 Maim[place] No complications +Epi    Gyn Hx: cysts  Denies fibroids, abnormal paps, and STIs.

## 2024-09-01 LAB
CULTURE RESULTS: SIGNIFICANT CHANGE UP
SPECIMEN SOURCE: SIGNIFICANT CHANGE UP

## 2024-09-02 DIAGNOSIS — R35.0 FREQUENCY OF MICTURITION: ICD-10-CM

## 2024-09-02 DIAGNOSIS — O99.612 DISEASES OF THE DIGESTIVE SYSTEM COMPLICATING PREGNANCY, SECOND TRIMESTER: ICD-10-CM

## 2024-09-02 DIAGNOSIS — Z3A.23 23 WEEKS GESTATION OF PREGNANCY: ICD-10-CM

## 2024-09-02 DIAGNOSIS — O09.522 SUPERVISION OF ELDERLY MULTIGRAVIDA, SECOND TRIMESTER: ICD-10-CM

## 2024-09-02 DIAGNOSIS — K58.9 IRRITABLE BOWEL SYNDROME WITHOUT DIARRHEA: ICD-10-CM

## 2024-09-02 DIAGNOSIS — O09.12 SUPERVISION OF PREGNANCY WITH HISTORY OF ECTOPIC PREGNANCY, SECOND TRIMESTER: ICD-10-CM

## 2024-09-02 DIAGNOSIS — O26.892 OTHER SPECIFIED PREGNANCY RELATED CONDITIONS, SECOND TRIMESTER: ICD-10-CM

## 2024-09-02 DIAGNOSIS — Z90.79 ACQUIRED ABSENCE OF OTHER GENITAL ORGAN(S): ICD-10-CM

## 2024-09-02 DIAGNOSIS — O09.292 SUPERVISION OF PREGNANCY WITH OTHER POOR REPRODUCTIVE OR OBSTETRIC HISTORY, SECOND TRIMESTER: ICD-10-CM

## 2024-09-02 DIAGNOSIS — R10.9 UNSPECIFIED ABDOMINAL PAIN: ICD-10-CM

## 2024-09-05 ENCOUNTER — NON-APPOINTMENT (OUTPATIENT)
Age: 34
End: 2024-09-05

## 2024-09-05 LAB — GLUCOSE 1H P 50 G GLC PO SERPL-MCNC: 60 MG/DL

## 2024-09-07 PROBLEM — D64.9 ANEMIA: Status: ACTIVE | Noted: 2024-09-07

## 2024-09-07 LAB
BASOPHILS # BLD AUTO: 0.03 K/UL
BASOPHILS NFR BLD AUTO: 0.3 %
EOSINOPHIL # BLD AUTO: 0.14 K/UL
EOSINOPHIL NFR BLD AUTO: 1.6 %
FERRITIN SERPL-MCNC: 84 NG/ML
HCT VFR BLD CALC: 33.5 %
HGB BLD-MCNC: 10.9 G/DL
IMM GRANULOCYTES NFR BLD AUTO: 0.8 %
LYMPHOCYTES # BLD AUTO: 2.15 K/UL
LYMPHOCYTES NFR BLD AUTO: 24.4 %
MAN DIFF?: NORMAL
MCHC RBC-ENTMCNC: 30.8 PG
MCHC RBC-ENTMCNC: 32.5 G/DL
MCV RBC AUTO: 94.6 FL
MONOCYTES # BLD AUTO: 0.55 K/UL
MONOCYTES NFR BLD AUTO: 6.2 %
NEUTROPHILS # BLD AUTO: 5.88 K/UL
NEUTROPHILS NFR BLD AUTO: 66.7 %
PLATELET # BLD AUTO: 226 K/UL
PMV BLD AUTO: 0 /100 WBCS
RBC # BLD: 3.54 M/UL
RBC # FLD: 12.5 %
WBC # FLD AUTO: 8.82 K/UL

## 2024-09-07 RX ORDER — IRON,CARBONYL/ASCORBIC ACID 65MG-125MG
65-125 TABLET, DELAYED RELEASE (ENTERIC COATED) ORAL
Qty: 30 | Refills: 6 | Status: ACTIVE | COMMUNITY
Start: 2024-09-07 | End: 1900-01-01

## 2024-09-25 ENCOUNTER — APPOINTMENT (OUTPATIENT)
Dept: OBGYN | Facility: CLINIC | Age: 34
End: 2024-09-25
Payer: COMMERCIAL

## 2024-09-25 VITALS — BODY MASS INDEX: 26.82 KG/M2 | HEIGHT: 65 IN | WEIGHT: 161 LBS | TEMPERATURE: 97.9 F

## 2024-09-25 LAB
APPEARANCE: CLEAR
BILIRUBIN URINE: NEGATIVE
BLOOD URINE: NEGATIVE
COLOR: YELLOW
GLUCOSE QUALITATIVE U: NEGATIVE
KETONES URINE: NEGATIVE
LEUKOCYTE ESTERASE URINE: NEGATIVE
NITRITE URINE: NEGATIVE
PH URINE: 7
PROTEIN URINE: NEGATIVE
SPECIFIC GRAVITY URINE: <=1.005
UROBILINOGEN URINE: 0.2 (ref 0.2–?)

## 2024-09-25 PROCEDURE — 99213 OFFICE O/P EST LOW 20 MIN: CPT

## 2024-10-15 ENCOUNTER — APPOINTMENT (OUTPATIENT)
Dept: OBGYN | Facility: CLINIC | Age: 34
End: 2024-10-15
Payer: COMMERCIAL

## 2024-10-15 VITALS
WEIGHT: 160 LBS | BODY MASS INDEX: 26.66 KG/M2 | DIASTOLIC BLOOD PRESSURE: 72 MMHG | SYSTOLIC BLOOD PRESSURE: 110 MMHG | HEIGHT: 65 IN

## 2024-10-15 DIAGNOSIS — Z32.01 ENCOUNTER FOR PREGNANCY TEST, RESULT POSITIVE: ICD-10-CM

## 2024-10-15 LAB
APPEARANCE: CLEAR
BILIRUBIN URINE: NEGATIVE
BLOOD URINE: NEGATIVE
COLOR: YELLOW
GLUCOSE QUALITATIVE U: NEGATIVE
KETONES URINE: NEGATIVE
LEUKOCYTE ESTERASE URINE: NEGATIVE
NITRITE URINE: NEGATIVE
PH URINE: 7
PROTEIN URINE: NEGATIVE
SPECIFIC GRAVITY URINE: 1.02
UROBILINOGEN URINE: 0.2 (ref 0.2–?)

## 2024-10-15 PROCEDURE — 99213 OFFICE O/P EST LOW 20 MIN: CPT

## 2024-10-21 ENCOUNTER — OUTPATIENT (OUTPATIENT)
Dept: INPATIENT UNIT | Facility: HOSPITAL | Age: 34
LOS: 1 days | Discharge: HOME | End: 2024-10-21
Payer: COMMERCIAL

## 2024-10-21 VITALS — SYSTOLIC BLOOD PRESSURE: 126 MMHG | DIASTOLIC BLOOD PRESSURE: 60 MMHG | HEART RATE: 69 BPM

## 2024-10-21 VITALS
RESPIRATION RATE: 16 BRPM | DIASTOLIC BLOOD PRESSURE: 60 MMHG | TEMPERATURE: 98 F | SYSTOLIC BLOOD PRESSURE: 126 MMHG | HEART RATE: 69 BPM

## 2024-10-21 DIAGNOSIS — O26.899 OTHER SPECIFIED PREGNANCY RELATED CONDITIONS, UNSPECIFIED TRIMESTER: ICD-10-CM

## 2024-10-21 DIAGNOSIS — Z98.890 OTHER SPECIFIED POSTPROCEDURAL STATES: Chronic | ICD-10-CM

## 2024-10-21 PROCEDURE — 87491 CHLMYD TRACH DNA AMP PROBE: CPT

## 2024-10-21 PROCEDURE — 86703 HIV-1/HIV-2 1 RESULT ANTBDY: CPT

## 2024-10-21 PROCEDURE — 86850 RBC ANTIBODY SCREEN: CPT

## 2024-10-21 PROCEDURE — 87801 DETECT AGNT MULT DNA AMPLI: CPT

## 2024-10-21 PROCEDURE — 86901 BLOOD TYPING SEROLOGIC RH(D): CPT

## 2024-10-21 PROCEDURE — 85610 PROTHROMBIN TIME: CPT

## 2024-10-21 PROCEDURE — 87653 STREP B DNA AMP PROBE: CPT

## 2024-10-21 PROCEDURE — 87591 N.GONORRHOEAE DNA AMP PROB: CPT

## 2024-10-21 PROCEDURE — 85384 FIBRINOGEN ACTIVITY: CPT

## 2024-10-21 PROCEDURE — 87086 URINE CULTURE/COLONY COUNT: CPT

## 2024-10-21 PROCEDURE — 59025 FETAL NON-STRESS TEST: CPT

## 2024-10-21 PROCEDURE — 86900 BLOOD TYPING SEROLOGIC ABO: CPT

## 2024-10-21 PROCEDURE — 99214 OFFICE O/P EST MOD 30 MIN: CPT

## 2024-10-21 PROCEDURE — 81001 URINALYSIS AUTO W/SCOPE: CPT

## 2024-10-21 PROCEDURE — 87798 DETECT AGENT NOS DNA AMP: CPT

## 2024-10-21 PROCEDURE — 85025 COMPLETE CBC W/AUTO DIFF WBC: CPT

## 2024-10-21 PROCEDURE — 87661 TRICHOMONAS VAGINALIS AMPLIF: CPT

## 2024-10-21 PROCEDURE — 86592 SYPHILIS TEST NON-TREP QUAL: CPT

## 2024-10-21 PROCEDURE — 85730 THROMBOPLASTIN TIME PARTIAL: CPT

## 2024-10-21 PROCEDURE — 36415 COLL VENOUS BLD VENIPUNCTURE: CPT

## 2024-10-21 RX ORDER — SODIUM CHLORIDE IRRIG SOLUTION 0.9 %
1000 SOLUTION, IRRIGATION IRRIGATION
Refills: 0 | Status: DISCONTINUED | OUTPATIENT
Start: 2024-10-21 | End: 2024-10-22

## 2024-10-21 NOTE — OB PROVIDER TRIAGE NOTE - NSOBPROVIDERNOTE_OBGYN_ALL_OB_FT
34y  @ 30w5d, GBS unknown, r/o abruption.  -cont EFM/toco  -abruption labs ordered  -GBS, vaginitis, ua and ucx ordered  -reevaluate after labs result    Discussed with Dr. Morris 34y  @ 30w5d, GBS unknown, r/o abruption.  -cont EFM/toco  -abruption labs ordered  -GBS, vaginitis, ua and ucx ordered  -reevaluate after labs result    Discussed with Dr. Morris    ADDENDUM: patient seen and assessed at bedside, no complaints at this time, no further bleeding. Tracing reassuring and labs stable. Patient to be discharged with close follow up. Exam unchanged 0/0/-3.

## 2024-10-21 NOTE — OB PROVIDER TRIAGE NOTE - NSHPLABSRESULTS_GEN_ALL_CORE
6/20/24  Hep B NR  Hep C NR  Rubella equivocal   Measles immune  Varicella immune  RPR NR  A pos, antibody neg  HIV NR  sonos:  19w6d: EFW 367gms, MVP 5.02cm, anterior placenta, nl cord insertion, nl anatomy, cervical length 4.5cm

## 2024-10-21 NOTE — OB PROVIDER TRIAGE NOTE - NSHPPHYSICALEXAM_GEN_ALL_CORE
Physical Exam  Vital Signs Last 24 Hrs  T(F): 98.1 (21 Oct 2024 22:40), Max: 98.1 (21 Oct 2024 22:40)  HR: 69 (21 Oct 2024 22:41) (69 - 69)  BP: 126/60 (21 Oct 2024 22:41) (126/60 - 126/60)  RR: 16 (21 Oct 2024 22:40) (16 - 16)    Gen: NAD, AOx3  Abdomen: soft, gravid, nontender, no palpable contractions    EFM: 140bpm/moderate variability/+accels  [place]: irregular   SVE: 0/0/-3  BSS: variable presentation, BPP 8/8, MVP 3.8cm, anterior placenta  TVUS: cervical length 4.2cm, no previa noted  Spec: <5cc dark red blood, external os appears 1cm dilated

## 2024-10-21 NOTE — OB PROVIDER TRIAGE NOTE - HISTORY OF PRESENT ILLNESS
34y  @ 30w5d by first trimester sonogram, LUCIA 2024, AMA, presents for spotting with wiping. Pt reports at approx 2200, she wiped after using the bathroom and saw light pink spotting on toilet paper. Pt also reports mild right lower quadrant cramping, intermittent. Denies HA, fever, blurry vision, chest pain, SOB, constipation, diarrhea, nausea, vomiting, dysuria, hematuria, leg pain, and abnormal swelling of the hands and feet. Denies LOF, and ctx. +FM. No complications during this pregnancy.    Last sexual intercourse was > 2 weeks  Last PO: 2200  Last BM: this am

## 2024-10-22 LAB
APPEARANCE UR: CLEAR — SIGNIFICANT CHANGE UP
APTT BLD: 27.6 SEC — SIGNIFICANT CHANGE UP (ref 27–39.2)
APTT BLD: 28.7 SEC — SIGNIFICANT CHANGE UP (ref 27–39.2)
BASOPHILS # BLD AUTO: 0.02 K/UL — SIGNIFICANT CHANGE UP (ref 0–0.2)
BASOPHILS # BLD AUTO: 0.02 K/UL — SIGNIFICANT CHANGE UP (ref 0–0.2)
BASOPHILS NFR BLD AUTO: 0.2 % — SIGNIFICANT CHANGE UP (ref 0–1)
BASOPHILS NFR BLD AUTO: 0.2 % — SIGNIFICANT CHANGE UP (ref 0–1)
BILIRUB UR-MCNC: NEGATIVE — SIGNIFICANT CHANGE UP
COLOR SPEC: YELLOW — SIGNIFICANT CHANGE UP
DIFF PNL FLD: ABNORMAL
EOSINOPHIL # BLD AUTO: 0.16 K/UL — SIGNIFICANT CHANGE UP (ref 0–0.7)
EOSINOPHIL # BLD AUTO: 0.17 K/UL — SIGNIFICANT CHANGE UP (ref 0–0.7)
EOSINOPHIL NFR BLD AUTO: 1.6 % — SIGNIFICANT CHANGE UP (ref 0–8)
EOSINOPHIL NFR BLD AUTO: 1.8 % — SIGNIFICANT CHANGE UP (ref 0–8)
FIBRINOGEN PPP-MCNC: 329 MG/DL — SIGNIFICANT CHANGE UP (ref 200–435)
FIBRINOGEN PPP-MCNC: 449 MG/DL — HIGH (ref 200–435)
GLUCOSE UR QL: NEGATIVE MG/DL — SIGNIFICANT CHANGE UP
HCT VFR BLD CALC: 28.9 % — LOW (ref 37–47)
HCT VFR BLD CALC: 32.7 % — LOW (ref 37–47)
HGB BLD-MCNC: 11.2 G/DL — LOW (ref 12–16)
HGB BLD-MCNC: 9.9 G/DL — LOW (ref 12–16)
HIV 1 & 2 AB SERPL IA.RAPID: SIGNIFICANT CHANGE UP
IMM GRANULOCYTES NFR BLD AUTO: 0.7 % — HIGH (ref 0.1–0.3)
IMM GRANULOCYTES NFR BLD AUTO: 0.8 % — HIGH (ref 0.1–0.3)
INR BLD: 0.9 RATIO — SIGNIFICANT CHANGE UP (ref 0.65–1.3)
INR BLD: 0.98 RATIO — SIGNIFICANT CHANGE UP (ref 0.65–1.3)
KETONES UR-MCNC: NEGATIVE MG/DL — SIGNIFICANT CHANGE UP
LEUKOCYTE ESTERASE UR-ACNC: NEGATIVE — SIGNIFICANT CHANGE UP
LYMPHOCYTES # BLD AUTO: 2.74 K/UL — SIGNIFICANT CHANGE UP (ref 1.2–3.4)
LYMPHOCYTES # BLD AUTO: 27.9 % — SIGNIFICANT CHANGE UP (ref 20.5–51.1)
LYMPHOCYTES # BLD AUTO: 3.09 K/UL — SIGNIFICANT CHANGE UP (ref 1.2–3.4)
LYMPHOCYTES # BLD AUTO: 32.1 % — SIGNIFICANT CHANGE UP (ref 20.5–51.1)
MCHC RBC-ENTMCNC: 32.2 PG — HIGH (ref 27–31)
MCHC RBC-ENTMCNC: 32.2 PG — HIGH (ref 27–31)
MCHC RBC-ENTMCNC: 34.3 G/DL — SIGNIFICANT CHANGE UP (ref 32–37)
MCHC RBC-ENTMCNC: 34.3 G/DL — SIGNIFICANT CHANGE UP (ref 32–37)
MCV RBC AUTO: 94 FL — SIGNIFICANT CHANGE UP (ref 81–99)
MCV RBC AUTO: 94.1 FL — SIGNIFICANT CHANGE UP (ref 81–99)
MONOCYTES # BLD AUTO: 0.7 K/UL — HIGH (ref 0.1–0.6)
MONOCYTES # BLD AUTO: 0.73 K/UL — HIGH (ref 0.1–0.6)
MONOCYTES NFR BLD AUTO: 7.1 % — SIGNIFICANT CHANGE UP (ref 1.7–9.3)
MONOCYTES NFR BLD AUTO: 7.6 % — SIGNIFICANT CHANGE UP (ref 1.7–9.3)
NEUTROPHILS # BLD AUTO: 5.56 K/UL — SIGNIFICANT CHANGE UP (ref 1.4–6.5)
NEUTROPHILS # BLD AUTO: 6.12 K/UL — SIGNIFICANT CHANGE UP (ref 1.4–6.5)
NEUTROPHILS NFR BLD AUTO: 57.6 % — SIGNIFICANT CHANGE UP (ref 42.2–75.2)
NEUTROPHILS NFR BLD AUTO: 62.4 % — SIGNIFICANT CHANGE UP (ref 42.2–75.2)
NITRITE UR-MCNC: NEGATIVE — SIGNIFICANT CHANGE UP
NRBC # BLD: 0 /100 WBCS — SIGNIFICANT CHANGE UP (ref 0–0)
NRBC # BLD: 0 /100 WBCS — SIGNIFICANT CHANGE UP (ref 0–0)
PH UR: 6.5 — SIGNIFICANT CHANGE UP (ref 5–8)
PLATELET # BLD AUTO: 178 K/UL — SIGNIFICANT CHANGE UP (ref 130–400)
PLATELET # BLD AUTO: 201 K/UL — SIGNIFICANT CHANGE UP (ref 130–400)
PMV BLD: 10.1 FL — SIGNIFICANT CHANGE UP (ref 7.4–10.4)
PMV BLD: 10.2 FL — SIGNIFICANT CHANGE UP (ref 7.4–10.4)
PRENATAL SYPHILIS TEST: SIGNIFICANT CHANGE UP
PROT UR-MCNC: NEGATIVE MG/DL — SIGNIFICANT CHANGE UP
PROTHROM AB SERPL-ACNC: 10.3 SEC — SIGNIFICANT CHANGE UP (ref 9.95–12.87)
PROTHROM AB SERPL-ACNC: 11.2 SEC — SIGNIFICANT CHANGE UP (ref 9.95–12.87)
RBC # BLD: 3.07 M/UL — LOW (ref 4.2–5.4)
RBC # BLD: 3.48 M/UL — LOW (ref 4.2–5.4)
RBC # FLD: 12.6 % — SIGNIFICANT CHANGE UP (ref 11.5–14.5)
RBC # FLD: 12.7 % — SIGNIFICANT CHANGE UP (ref 11.5–14.5)
SP GR SPEC: <1.005 — LOW (ref 1–1.03)
UROBILINOGEN FLD QL: 0.2 MG/DL — SIGNIFICANT CHANGE UP (ref 0.2–1)
WBC # BLD: 9.64 K/UL — SIGNIFICANT CHANGE UP (ref 4.8–10.8)
WBC # BLD: 9.82 K/UL — SIGNIFICANT CHANGE UP (ref 4.8–10.8)
WBC # FLD AUTO: 9.64 K/UL — SIGNIFICANT CHANGE UP (ref 4.8–10.8)
WBC # FLD AUTO: 9.82 K/UL — SIGNIFICANT CHANGE UP (ref 4.8–10.8)

## 2024-10-22 RX ADMIN — Medication 125 MILLILITER(S): at 00:52

## 2024-10-23 LAB
CULTURE RESULTS: SIGNIFICANT CHANGE UP
GROUP B BETA STREP DNA (PCR): SIGNIFICANT CHANGE UP
SOURCE GROUP B STREP: SIGNIFICANT CHANGE UP
SPECIMEN SOURCE: SIGNIFICANT CHANGE UP

## 2024-10-24 DIAGNOSIS — O09.293 SUPERVISION OF PREGNANCY WITH OTHER POOR REPRODUCTIVE OR OBSTETRIC HISTORY, THIRD TRIMESTER: ICD-10-CM

## 2024-10-24 DIAGNOSIS — O09.523 SUPERVISION OF ELDERLY MULTIGRAVIDA, THIRD TRIMESTER: ICD-10-CM

## 2024-10-24 DIAGNOSIS — R10.31 RIGHT LOWER QUADRANT PAIN: ICD-10-CM

## 2024-10-24 DIAGNOSIS — O26.853 SPOTTING COMPLICATING PREGNANCY, THIRD TRIMESTER: ICD-10-CM

## 2024-10-24 DIAGNOSIS — Z3A.30 30 WEEKS GESTATION OF PREGNANCY: ICD-10-CM

## 2024-10-24 DIAGNOSIS — O26.893 OTHER SPECIFIED PREGNANCY RELATED CONDITIONS, THIRD TRIMESTER: ICD-10-CM

## 2024-10-24 DIAGNOSIS — O09.13 SUPERVISION OF PREGNANCY WITH HISTORY OF ECTOPIC PREGNANCY, THIRD TRIMESTER: ICD-10-CM

## 2024-10-25 ENCOUNTER — NON-APPOINTMENT (OUTPATIENT)
Age: 34
End: 2024-10-25

## 2024-10-25 LAB
A VAGINAE DNA VAG QL NAA+PROBE: SIGNIFICANT CHANGE UP
BVAB2 DNA VAG QL NAA+PROBE: SIGNIFICANT CHANGE UP
C ALBICANS DNA VAG QL NAA+PROBE: NEGATIVE — SIGNIFICANT CHANGE UP
C GLABRATA DNA VAG QL NAA+PROBE: NEGATIVE — SIGNIFICANT CHANGE UP
C KRUSEI DNA VAG QL NAA+PROBE: NEGATIVE — SIGNIFICANT CHANGE UP
C LUSITANIAE DNA VAG QL NAA+PROBE: NEGATIVE — SIGNIFICANT CHANGE UP
C TRACH RRNA SPEC QL NAA+PROBE: NEGATIVE — SIGNIFICANT CHANGE UP
CANDIDA DNA VAG QL NAA+PROBE: NEGATIVE — SIGNIFICANT CHANGE UP
MEGA1 DNA VAG QL NAA+PROBE: SIGNIFICANT CHANGE UP
N GONORRHOEA RRNA SPEC QL NAA+PROBE: NEGATIVE — SIGNIFICANT CHANGE UP
T VAGINALIS RRNA SPEC QL NAA+PROBE: NEGATIVE — SIGNIFICANT CHANGE UP

## 2024-10-28 ENCOUNTER — EMERGENCY (EMERGENCY)
Facility: HOSPITAL | Age: 34
LOS: 0 days | Discharge: HOME | End: 2024-10-28
Attending: EMERGENCY MEDICINE
Payer: COMMERCIAL

## 2024-10-28 VITALS
TEMPERATURE: 98 F | WEIGHT: 160.06 LBS | SYSTOLIC BLOOD PRESSURE: 112 MMHG | HEART RATE: 68 BPM | DIASTOLIC BLOOD PRESSURE: 70 MMHG | OXYGEN SATURATION: 98 % | RESPIRATION RATE: 18 BRPM | HEIGHT: 65 IN

## 2024-10-28 DIAGNOSIS — K21.9 GASTRO-ESOPHAGEAL REFLUX DISEASE WITHOUT ESOPHAGITIS: ICD-10-CM

## 2024-10-28 DIAGNOSIS — O99.513 DISEASES OF THE RESPIRATORY SYSTEM COMPLICATING PREGNANCY, THIRD TRIMESTER: ICD-10-CM

## 2024-10-28 DIAGNOSIS — Z3A.32 32 WEEKS GESTATION OF PREGNANCY: ICD-10-CM

## 2024-10-28 DIAGNOSIS — O99.613 DISEASES OF THE DIGESTIVE SYSTEM COMPLICATING PREGNANCY, THIRD TRIMESTER: ICD-10-CM

## 2024-10-28 DIAGNOSIS — Z88.0 ALLERGY STATUS TO PENICILLIN: ICD-10-CM

## 2024-10-28 DIAGNOSIS — Z88.1 ALLERGY STATUS TO OTHER ANTIBIOTIC AGENTS: ICD-10-CM

## 2024-10-28 DIAGNOSIS — J02.9 ACUTE PHARYNGITIS, UNSPECIFIED: ICD-10-CM

## 2024-10-28 DIAGNOSIS — Z98.890 OTHER SPECIFIED POSTPROCEDURAL STATES: Chronic | ICD-10-CM

## 2024-10-28 PROCEDURE — 76857 US EXAM PELVIC LIMITED: CPT

## 2024-10-28 PROCEDURE — 36415 COLL VENOUS BLD VENIPUNCTURE: CPT

## 2024-10-28 PROCEDURE — 87081 CULTURE SCREEN ONLY: CPT

## 2024-10-28 PROCEDURE — 87799 DETECT AGENT NOS DNA QUANT: CPT

## 2024-10-28 PROCEDURE — 86663 EPSTEIN-BARR ANTIBODY: CPT

## 2024-10-28 PROCEDURE — 99285 EMERGENCY DEPT VISIT HI MDM: CPT

## 2024-10-28 PROCEDURE — 86665 EPSTEIN-BARR CAPSID VCA: CPT

## 2024-10-28 PROCEDURE — 99284 EMERGENCY DEPT VISIT MOD MDM: CPT | Mod: 25

## 2024-10-28 PROCEDURE — 76857 US EXAM PELVIC LIMITED: CPT | Mod: 26

## 2024-10-28 PROCEDURE — 86664 EPSTEIN-BARR NUCLEAR ANTIGEN: CPT

## 2024-10-28 RX ORDER — CEFDINIR 250 MG/5ML
1 POWDER, FOR SUSPENSION ORAL
Qty: 14 | Refills: 0
Start: 2024-10-28 | End: 2024-11-03

## 2024-10-28 RX ORDER — DIPHENHYDRAMINE HYDROCHLORIDE AND LIDOCAINE HYDROCHLORIDE AND ALUMINUM HYDROXIDE AND MAGNESIUM HYDRO
10 KIT ONCE
Refills: 0 | Status: COMPLETED | OUTPATIENT
Start: 2024-10-28 | End: 2024-10-28

## 2024-10-28 RX ORDER — ACETAMINOPHEN 325 MG
975 TABLET ORAL ONCE
Refills: 0 | Status: COMPLETED | OUTPATIENT
Start: 2024-10-28 | End: 2024-10-28

## 2024-10-28 RX ORDER — MAG HYDROX/ALUMINUM HYD/SIMETH 200-200-20
10 SUSPENSION, ORAL (FINAL DOSE FORM) ORAL
Qty: 210 | Refills: 0
Start: 2024-10-28 | End: 2024-11-03

## 2024-10-28 RX ADMIN — Medication 975 MILLIGRAM(S): at 09:22

## 2024-10-28 RX ADMIN — DIPHENHYDRAMINE HYDROCHLORIDE AND LIDOCAINE HYDROCHLORIDE AND ALUMINUM HYDROXIDE AND MAGNESIUM HYDRO 10 MILLILITER(S): KIT at 09:23

## 2024-10-28 NOTE — ED PROVIDER NOTE - OBJECTIVE STATEMENT
34-year-old female with history of GERD, currently approximately 32 weeks pregnant, presents ED with complaint of sore throat.  Patient states that the pain started on the right side of her throat 4 days ago.  Since then has worsened and now its on both sides.  Having difficulty swallowing because of the pain, but still able to tolerate some p.o.  No fevers.  Patient does report runny nose, congestion and a nonproductive cough.  Known sick contact,  has been sick with similar URI symptoms prior to her symptom onset.  Patient is also experiencing some burning reflux.  Taking omeprazole at home without much relief.  No other chest pain or difficulty breathing, no other abdominal pain.  No current vaginal bleeding or any urinary symptoms.

## 2024-10-28 NOTE — ED PROVIDER NOTE - CLINICAL SUMMARY MEDICAL DECISION MAKING FREE TEXT BOX
No distress.  VSS.  No abdominal pain.  US shows good FHR and fetal movement.  Suspect EBV.  Labs sent.  Patient instructed to f/u with [facility] for results.  Strict return instructions discussed.

## 2024-10-28 NOTE — ED ADULT TRIAGE NOTE - CHIEF COMPLAINT QUOTE
Pt. c/o throat pain, cough, R ear pain, and runny nose x 2 days. Pt. has hx of acid reflux; pt. Omeprazole 40mg yesterday, with no relief. Pt. denies chest pain/SOB. Pt. states she is 31 weeks and 6 days pregnant; denies any OB/GYN symptoms.

## 2024-10-28 NOTE — ED PROVIDER NOTE - PHYSICAL EXAMINATION
VITAL SIGNS: I have reviewed nursing notes and confirm.  CONSTITUTIONAL: Well-developed; well-nourished; in no acute distress.  SKIN: Skin exam is warm and dry, no acute rash.  HEAD: Normocephalic; atraumatic.  EYES: PERRL, EOM intact; conjunctiva and sclera clear.  ENT: airway clear. TMs clear. Posterior pharynx erythematous with exudates bilaterally.  Uvula midline.  Tolerating secretions.  No trismus.  NECK: Supple, No cervical lymphadenopathy.  CARD: S1, S2 normal; no murmurs, gallops, or rubs. Regular rate and rhythm.  RESP: No wheezes, rales or rhonchi.  ABD: Normal bowel sounds; soft; Gravid, non-tender  EXT: Normal ROM.   NEURO: Alert, oriented.   PSYCH: Cooperative, appropriate.

## 2024-10-29 ENCOUNTER — APPOINTMENT (OUTPATIENT)
Dept: OBGYN | Facility: CLINIC | Age: 34
End: 2024-10-29
Payer: COMMERCIAL

## 2024-10-29 VITALS — WEIGHT: 163 LBS | HEIGHT: 65 IN | TEMPERATURE: 97.1 F | BODY MASS INDEX: 27.16 KG/M2

## 2024-10-29 LAB
APPEARANCE: CLEAR
BILIRUBIN URINE: NEGATIVE
BLOOD URINE: NEGATIVE
COLOR: YELLOW
CULTURE RESULTS: SIGNIFICANT CHANGE UP
EBV DNA SERPL NAA+PROBE-ACNC: SIGNIFICANT CHANGE UP IU/ML
EBV EA AB SER IA-ACNC: 28.3 U/ML — HIGH
EBV EA AB TITR SER IF: POSITIVE
EBV EA IGG SER-ACNC: POSITIVE
EBV NA IGG SER IA-ACNC: 197 U/ML — HIGH
EBV PATRN SPEC IB-IMP: SIGNIFICANT CHANGE UP
EBV VCA IGG AVIDITY SER QL IA: POSITIVE
EBV VCA IGM SER IA-ACNC: 97.5 U/ML — HIGH
EBV VCA IGM SER IA-ACNC: <10 U/ML — SIGNIFICANT CHANGE UP
EBV VCA IGM TITR FLD: NEGATIVE — SIGNIFICANT CHANGE UP
EBVPCR LOG: SIGNIFICANT CHANGE UP LOG10IU/ML
GLUCOSE QUALITATIVE U: NEGATIVE
KETONES URINE: 40
LEUKOCYTE ESTERASE URINE: NEGATIVE
NITRITE URINE: NEGATIVE
PH URINE: 7
PROTEIN URINE: ABNORMAL
SPECIFIC GRAVITY URINE: 1.02
SPECIMEN SOURCE: SIGNIFICANT CHANGE UP
UROBILINOGEN URINE: 0.2 (ref 0.2–?)

## 2024-10-29 PROCEDURE — 99213 OFFICE O/P EST LOW 20 MIN: CPT | Mod: 25

## 2024-10-29 PROCEDURE — 76816 OB US FOLLOW-UP PER FETUS: CPT

## 2024-10-29 PROCEDURE — 76819 FETAL BIOPHYS PROFIL W/O NST: CPT | Mod: 59

## 2024-11-09 ENCOUNTER — NON-APPOINTMENT (OUTPATIENT)
Age: 34
End: 2024-11-09

## 2024-11-13 ENCOUNTER — APPOINTMENT (OUTPATIENT)
Dept: OBGYN | Facility: CLINIC | Age: 34
End: 2024-11-13
Payer: MEDICAID

## 2024-11-13 VITALS
WEIGHT: 166 LBS | HEIGHT: 65 IN | SYSTOLIC BLOOD PRESSURE: 124 MMHG | BODY MASS INDEX: 27.66 KG/M2 | DIASTOLIC BLOOD PRESSURE: 74 MMHG

## 2024-11-13 DIAGNOSIS — Z32.01 ENCOUNTER FOR PREGNANCY TEST, RESULT POSITIVE: ICD-10-CM

## 2024-11-13 LAB
APPEARANCE: CLEAR
BILIRUBIN URINE: NEGATIVE
BLOOD URINE: NEGATIVE
COLOR: YELLOW
GLUCOSE QUALITATIVE U: NEGATIVE
KETONES URINE: NEGATIVE
LEUKOCYTE ESTERASE URINE: NEGATIVE
NITRITE URINE: NEGATIVE
PH URINE: 6.5
PROTEIN URINE: NEGATIVE
SPECIFIC GRAVITY URINE: 1.02
UROBILINOGEN URINE: 0.2 (ref 0.2–?)

## 2024-11-13 PROCEDURE — 99213 OFFICE O/P EST LOW 20 MIN: CPT | Mod: TH

## 2024-11-19 ENCOUNTER — APPOINTMENT (OUTPATIENT)
Dept: OBGYN | Facility: CLINIC | Age: 34
End: 2024-11-19
Payer: MEDICAID

## 2024-11-19 ENCOUNTER — LABORATORY RESULT (OUTPATIENT)
Age: 34
End: 2024-11-19

## 2024-11-19 VITALS — BODY MASS INDEX: 27.32 KG/M2 | HEIGHT: 65 IN | TEMPERATURE: 97.3 F | WEIGHT: 164 LBS

## 2024-11-19 LAB
APPEARANCE: CLEAR
BILIRUBIN URINE: NEGATIVE
BLOOD URINE: NEGATIVE
COLOR: YELLOW
GLUCOSE QUALITATIVE U: NEGATIVE
KETONES URINE: 15
LEUKOCYTE ESTERASE URINE: NEGATIVE
NITRITE URINE: NEGATIVE
PH URINE: 7
PROTEIN URINE: NEGATIVE
SPECIFIC GRAVITY URINE: 1.02
UROBILINOGEN URINE: 0.2 (ref 0.2–?)

## 2024-11-19 PROCEDURE — 99213 OFFICE O/P EST LOW 20 MIN: CPT | Mod: TH

## 2024-11-26 ENCOUNTER — APPOINTMENT (OUTPATIENT)
Dept: OBGYN | Facility: CLINIC | Age: 34
End: 2024-11-26
Payer: MEDICAID

## 2024-11-26 VITALS
WEIGHT: 169.6 LBS | SYSTOLIC BLOOD PRESSURE: 133 MMHG | DIASTOLIC BLOOD PRESSURE: 78 MMHG | BODY MASS INDEX: 28.26 KG/M2 | HEIGHT: 65 IN

## 2024-11-26 DIAGNOSIS — Z32.01 ENCOUNTER FOR PREGNANCY TEST, RESULT POSITIVE: ICD-10-CM

## 2024-11-26 PROCEDURE — 99213 OFFICE O/P EST LOW 20 MIN: CPT | Mod: TH

## 2024-11-27 ENCOUNTER — NON-APPOINTMENT (OUTPATIENT)
Age: 34
End: 2024-11-27

## 2024-12-05 ENCOUNTER — APPOINTMENT (OUTPATIENT)
Dept: OBGYN | Facility: CLINIC | Age: 34
End: 2024-12-05
Payer: MEDICAID

## 2024-12-05 VITALS — WEIGHT: 167 LBS | BODY MASS INDEX: 27.82 KG/M2 | HEIGHT: 65 IN | TEMPERATURE: 97.3 F

## 2024-12-05 PROCEDURE — 99213 OFFICE O/P EST LOW 20 MIN: CPT | Mod: TH,25

## 2024-12-05 PROCEDURE — 76819 FETAL BIOPHYS PROFIL W/O NST: CPT | Mod: 59

## 2024-12-05 PROCEDURE — 76816 OB US FOLLOW-UP PER FETUS: CPT

## 2024-12-10 ENCOUNTER — NON-APPOINTMENT (OUTPATIENT)
Age: 34
End: 2024-12-10

## 2024-12-11 ENCOUNTER — APPOINTMENT (OUTPATIENT)
Dept: OBGYN | Facility: CLINIC | Age: 34
End: 2024-12-11
Payer: MEDICAID

## 2024-12-11 ENCOUNTER — ASOB RESULT (OUTPATIENT)
Age: 34
End: 2024-12-11

## 2024-12-11 VITALS
HEIGHT: 65 IN | SYSTOLIC BLOOD PRESSURE: 136 MMHG | WEIGHT: 166 LBS | BODY MASS INDEX: 27.66 KG/M2 | DIASTOLIC BLOOD PRESSURE: 84 MMHG

## 2024-12-11 DIAGNOSIS — Z32.01 ENCOUNTER FOR PREGNANCY TEST, RESULT POSITIVE: ICD-10-CM

## 2024-12-11 PROCEDURE — 99213 OFFICE O/P EST LOW 20 MIN: CPT | Mod: TH

## 2024-12-11 PROCEDURE — 76819 FETAL BIOPHYS PROFIL W/O NST: CPT

## 2024-12-12 ENCOUNTER — OUTPATIENT (OUTPATIENT)
Dept: INPATIENT UNIT | Facility: HOSPITAL | Age: 34
LOS: 1 days | Discharge: HOME | End: 2024-12-12
Payer: MEDICAID

## 2024-12-12 VITALS
TEMPERATURE: 98 F | RESPIRATION RATE: 16 BRPM | SYSTOLIC BLOOD PRESSURE: 126 MMHG | HEART RATE: 82 BPM | DIASTOLIC BLOOD PRESSURE: 70 MMHG

## 2024-12-12 DIAGNOSIS — Z98.890 OTHER SPECIFIED POSTPROCEDURAL STATES: Chronic | ICD-10-CM

## 2024-12-12 DIAGNOSIS — Z90.79 ACQUIRED ABSENCE OF OTHER GENITAL ORGAN(S): Chronic | ICD-10-CM

## 2024-12-12 DIAGNOSIS — O26.899 OTHER SPECIFIED PREGNANCY RELATED CONDITIONS, UNSPECIFIED TRIMESTER: ICD-10-CM

## 2024-12-12 PROCEDURE — 99214 OFFICE O/P EST MOD 30 MIN: CPT

## 2024-12-12 NOTE — OB PROVIDER TRIAGE NOTE - HISTORY OF PRESENT ILLNESS
35yo  at 38w1d, LUCIA 24 by LMP c/w first trimester sono, presenting to L&D following an episode of red/brown vaginal spotting.  Patient noticed blood upon wiping and then had streaks of blood on 2 panty-liners.  Episode has since resolved.  Reports irregular contractions, rated 6/10.  Reports good fetal movement.  Denies LOF.  Patient was examined in the office yesterday and found to be 2/50/-2.  GBS positive.    Pregnancy c/b:    #AMA  -NIPTs low risk    #PCN and macrobid allergy  -tolerated amoxicillin per patient

## 2024-12-12 NOTE — OB PROVIDER TRIAGE NOTE - NS_OBGYNHISTORY_OBGYN_ALL_OB_FT
Ob hx:   FT  x1, 3373g  2 SAB, no d&c  eTOP x2, d&c x1, medical management x1  1 ectopic pregnancy requiring right salpingectomy    Gyn hx: reports h/o ovarian cysts, denies h/o abnormal paps, STIs, fibroids, endometriosis

## 2024-12-12 NOTE — OB RN TRIAGE NOTE - TEMPERATURE IN CELSIUS (DEGREES C)
Impression: Dry eye syndrome of bilateral lacrimal glands: H04.123. Plan: Symptoms of vision fluctuation is largely due to previous RK surgery and dry eye. No evidence of significant cataract. Reassure, review PRN Dry eyes account for the patient's complaints. There are no objective signs or evidence of permanent corneal damage. Suggested artificial tears for comfort. 36.9

## 2024-12-12 NOTE — OB PROVIDER TRIAGE NOTE - NSHPLABSRESULTS_GEN_ALL_CORE
6/20  HIV NR  Hep B NR  HCV NR  RPR NR  Rubella, varicella immune  A+  antibody screen neg    9/4  GCT 60    11/7  HIV NR    11/19  GBS pos    sonos:  36w3d- cephalic, anterior placenta, MVP 3.04cm, BPP 8/8, EFW 3032g  32w4d- cephalic, anterior placenta, MVP 4.37cm, BPP 8/8, EFW 2152g  19w6d- variable, anterior placenta, MVP 5.02cm, EFW 367g, normal anatomy

## 2024-12-12 NOTE — OB RN TRIAGE NOTE - FALL HARM RISK - UNIVERSAL INTERVENTIONS
Bed in lowest position, wheels locked, appropriate side rails in place/Call bell, personal items and telephone in reach/Instruct patient to call for assistance before getting out of bed or chair/Non-slip footwear when patient is out of bed/[place] to call system/Purposeful Proactive Rounding/Room/bathroom lighting operational, light cord in reach

## 2024-12-12 NOTE — OB PROVIDER TRIAGE NOTE - NSHPPHYSICALEXAM_GEN_ALL_CORE
Physical exam:    Vital Signs Last 24 Hrs  HR: 82 (12 Dec 2024 05:25) (82 - 82)  BP: 126/70 (12 Dec 2024 05:25) (126/70 - 126/70)  RR: 16 (12 Dec 2024 05:25) (16 - 16)    Gen: AAOx3, NAD  Heart: RRR, S1 S2 WNL  Lungs: CTAB  Abdomen: Soft, nontender, no distension, gravid  Ext: No calf tenderness, no swelling    EFM: 150/mod/+accels  toco: irreg  SVE: 2/50/-3  Speculum: no active bleeding from the cervical os  EFW by leopold: 3400

## 2024-12-12 NOTE — OB PROVIDER TRIAGE NOTE - NSOBPROVIDERNOTE_OBGYN_ALL_OB_FT
A/P: 33yo  at 38w1d presenting with resolved episode of vaginal spotting and irregular contractions found not to be in labor.  Reassuring fetal and maternal status.    -PO hydration encouraged  -FKC reviewed  -labor precautions discussed  -cleared for discharge    Discussed with Dr. Morris and Dr. Da Silva

## 2024-12-12 NOTE — OB PROVIDER TRIAGE NOTE - NSICDXPASTSURGICALHX_GEN_ALL_CORE_FT
PAST SURGICAL HISTORY:  H/O liposuction of abdomen     History of augmentation of both breasts     History of D&C     History of salpingectomy     S/P abdominoplasty

## 2024-12-12 NOTE — OB PROVIDER TRIAGE NOTE - NSRISKFACTORS_OBGYN_ALL_OB
[facility], [address] [place] [address], [address] [place] [address] [address]  Phone: [phone]   Fax:     [phone]    Physical Therapy  Cancellation/No-show Note  Patient Name:  Taj Vasques  :  1959   Date:  2021    Cancelled visits to date: 5  No-shows to date: 0    For today's appointment patient:  [x]  Cancelled  []  Rescheduled appointment  []  No-show     Reason given by patient:  []  Patient ill  []  Conflicting appointment  []  No transportation    []  Conflict with work  []  No reason given  [x]  Other:     Comments: death of a friend. Phone call information:   []  Phone call made today to patient at am/pm at the number provided:      []  Patient answered, conversation as follows:    []  Patient did not answer, message left as follows:  [x]  Phone call not needed - pt contacted us to cancel and provided reason for cancellation.      Electronically signed by:  Augusta Torres PT, PT No

## 2024-12-13 ENCOUNTER — INPATIENT (INPATIENT)
Facility: HOSPITAL | Age: 34
LOS: 1 days | Discharge: HOME | DRG: 566 | End: 2024-12-15
Attending: STUDENT IN AN ORGANIZED HEALTH CARE EDUCATION/TRAINING PROGRAM | Admitting: STUDENT IN AN ORGANIZED HEALTH CARE EDUCATION/TRAINING PROGRAM
Payer: MEDICAID

## 2024-12-13 VITALS
HEART RATE: 68 BPM | RESPIRATION RATE: 18 BRPM | DIASTOLIC BLOOD PRESSURE: 71 MMHG | SYSTOLIC BLOOD PRESSURE: 127 MMHG | TEMPERATURE: 99 F

## 2024-12-13 DIAGNOSIS — Z98.890 OTHER SPECIFIED POSTPROCEDURAL STATES: Chronic | ICD-10-CM

## 2024-12-13 DIAGNOSIS — O26.899 OTHER SPECIFIED PREGNANCY RELATED CONDITIONS, UNSPECIFIED TRIMESTER: ICD-10-CM

## 2024-12-13 DIAGNOSIS — O26.893 OTHER SPECIFIED PREGNANCY RELATED CONDITIONS, THIRD TRIMESTER: ICD-10-CM

## 2024-12-13 DIAGNOSIS — Z90.79 ACQUIRED ABSENCE OF OTHER GENITAL ORGAN(S): Chronic | ICD-10-CM

## 2024-12-13 LAB
APPEARANCE UR: ABNORMAL
BILIRUB UR-MCNC: NEGATIVE — SIGNIFICANT CHANGE UP
COLOR SPEC: YELLOW — SIGNIFICANT CHANGE UP
DIFF PNL FLD: ABNORMAL
GLUCOSE UR QL: NEGATIVE MG/DL — SIGNIFICANT CHANGE UP
KETONES UR-MCNC: NEGATIVE MG/DL — SIGNIFICANT CHANGE UP
LEUKOCYTE ESTERASE UR-ACNC: ABNORMAL
NITRITE UR-MCNC: NEGATIVE — SIGNIFICANT CHANGE UP
PH UR: 6.5 — SIGNIFICANT CHANGE UP (ref 5–8)
PROT UR-MCNC: NEGATIVE MG/DL — SIGNIFICANT CHANGE UP
SP GR SPEC: 1 — SIGNIFICANT CHANGE UP (ref 1–1.03)
UROBILINOGEN FLD QL: 0.2 MG/DL — SIGNIFICANT CHANGE UP (ref 0.2–1)

## 2024-12-13 PROCEDURE — 86592 SYPHILIS TEST NON-TREP QUAL: CPT

## 2024-12-13 PROCEDURE — 86900 BLOOD TYPING SEROLOGIC ABO: CPT

## 2024-12-13 PROCEDURE — 85025 COMPLETE CBC W/AUTO DIFF WBC: CPT

## 2024-12-13 PROCEDURE — 81001 URINALYSIS AUTO W/SCOPE: CPT

## 2024-12-13 PROCEDURE — 80307 DRUG TEST PRSMV CHEM ANLYZR: CPT

## 2024-12-13 PROCEDURE — 86901 BLOOD TYPING SEROLOGIC RH(D): CPT

## 2024-12-13 PROCEDURE — 80053 COMPREHEN METABOLIC PANEL: CPT

## 2024-12-13 PROCEDURE — 84550 ASSAY OF BLOOD/URIC ACID: CPT

## 2024-12-13 PROCEDURE — 80354 DRUG SCREENING FENTANYL: CPT

## 2024-12-13 PROCEDURE — 83615 LACTATE (LD) (LDH) ENZYME: CPT

## 2024-12-13 PROCEDURE — 59050 FETAL MONITOR W/REPORT: CPT

## 2024-12-13 PROCEDURE — 86850 RBC ANTIBODY SCREEN: CPT

## 2024-12-13 PROCEDURE — 36415 COLL VENOUS BLD VENIPUNCTURE: CPT

## 2024-12-13 RX ORDER — AMPICILLIN TRIHYDRATE 250 MG/5ML
2 SUSPENSION, RECONSTITUTED, ORAL (ML) ORAL ONCE
Refills: 0 | Status: COMPLETED | OUTPATIENT
Start: 2024-12-13 | End: 2024-12-13

## 2024-12-13 RX ORDER — AMPICILLIN TRIHYDRATE 250 MG/5ML
1 SUSPENSION, RECONSTITUTED, ORAL (ML) ORAL EVERY 4 HOURS
Refills: 0 | Status: DISCONTINUED | OUTPATIENT
Start: 2024-12-13 | End: 2024-12-14

## 2024-12-13 RX ORDER — 0.9 % SODIUM CHLORIDE 0.9 %
1000 INTRAVENOUS SOLUTION INTRAVENOUS
Refills: 0 | Status: DISCONTINUED | OUTPATIENT
Start: 2024-12-13 | End: 2024-12-14

## 2024-12-13 RX ADMIN — Medication 125 MILLILITER(S): at 23:23

## 2024-12-13 RX ADMIN — Medication 200 GRAM(S): at 23:23

## 2024-12-13 NOTE — OB PROVIDER H&P - NSLOWPPHRISK_OBGYN_A_OB
No previous uterine incision/[place] Pregnancy/Less than or equal to 4 previous vaginal births/No known bleeding disorder/No history of postpartum hemorrhage/No other PPH risks indicated

## 2024-12-13 NOTE — OB PROVIDER H&P - NSHPPHYSICALEXAM_GEN_ALL_CORE
T(C): 37 (12-13-24 @ 22:26), Max: 37.1 (12-13-24 @ 21:37)  HR: 82 (12-13-24 @ 22:42) (68 - 82)  BP: 141/93 (12-13-24 @ 22:42) (127/71 - 141/93)  RR: 18 (12-13-24 @ 22:26) (18 - 18)  SpO2: --      Gen: A+OX3. NAD  Abd: Soft, Nontender. Gravid.  SVE:  2/50/-3  Spec: no pooling, no vaginal bleeding noted  FHR: 130s/mod/+accles   TOCO: q4  BSS: cephalic, anterior placenta, BPP 6/9 MVP 1.7cm      EFW by [place]: 3200

## 2024-12-13 NOTE — OB PROVIDER H&P - NS_VAGBLEEDCOLOR_OBGYN_ALL_OB
[facility]    NAME: Jose Gramajo is a 11 y o  male  : 2003    MRN: [id number]  DATE: 2018  TIME: 4:13 PM    Assessment and Plan   Encounter for 's license history and physical [Z02 4]  1  Encounter for 's license history and physical       Patient Instructions   Forms filled out in entirety and returned to parent  All questions answered  Driving precautions given  Chief Complaint     Chief Complaint   Patient presents with    Annual Exam     physical exam for 's license   History of Present Illness       11year-old male brought in by mom for 's license physical   No acute complaints or past medical history  Review of Systems   Review of Systems   HENT: Negative for hearing loss  Eyes: Negative for visual disturbance  Respiratory: Negative for cough, shortness of breath and wheezing  Cardiovascular: Negative for chest pain and palpitations  Gastrointestinal: Negative for abdominal pain, nausea and vomiting  Musculoskeletal: Negative for arthralgias and myalgias  Neurological: Negative for weakness and numbness  Current Medications     No current outpatient prescriptions on file  Current Allergies     Allergies as of 2018 - Reviewed 2018   Allergen Reaction Noted    Nuts  2018    Other Itching 2015    Prunus persica Hives 2017          The following portions of the patient's history were reviewed and updated as appropriate: allergies, current medications, past family history, past medical history, past social history, past surgical history and problem list      History reviewed  No pertinent past medical history  History reviewed  No pertinent surgical history  History reviewed  No pertinent family history  Medications have been verified          Objective   BP (!) 113/87   Pulse 77   Temp 97 8 °F (36 6 °C) (Temporal)   Resp (!) 20   Ht 5' 7" (1 702 m)   Wt 82 6 kg (182 lb) SpO2 98%   BMI 28 51 kg/m²        Physical Exam     Physical Exam   Constitutional: He is oriented to person, place, and time  He appears well-developed and well-nourished  No distress  HENT:   Head: Normocephalic and atraumatic  Right Ear: Hearing, tympanic membrane, external ear and ear canal normal    Left Ear: Hearing, tympanic membrane, external ear and ear canal normal    Nose: Nose normal  No mucosal edema  Mouth/Throat: Uvula is midline and oropharynx is clear and moist  No oropharyngeal exudate, posterior oropharyngeal edema or posterior oropharyngeal erythema  Eyes: Conjunctivae and EOM are normal  Pupils are equal, round, and reactive to light  Right eye exhibits no discharge  Left eye exhibits no discharge  No scleral icterus  Neck: Normal range of motion  Neck supple  No thyromegaly present  Cardiovascular: Normal rate, regular rhythm and normal heart sounds  No murmur heard  Pulmonary/Chest: Effort normal and breath sounds normal  No respiratory distress  He has no decreased breath sounds  He has no wheezes  He has no rhonchi  He has no rales  Abdominal: Soft  Bowel sounds are normal  He exhibits no distension and no mass  There is no tenderness  There is no rebound and no guarding  Musculoskeletal: Normal range of motion  He exhibits no edema, tenderness or deformity  Lymphadenopathy:     He has no cervical adenopathy  Neurological: He is alert and oriented to person, place, and time  He has normal reflexes  No cranial nerve deficit  He exhibits normal muscle tone  Coordination normal    Skin: Skin is warm and dry  No rash noted  He is not diaphoretic  Psychiatric: He has a normal mood and affect  His behavior is normal    Nursing note and vitals reviewed  N/A

## 2024-12-13 NOTE — OB PROVIDER TRIAGE NOTE - HISTORY OF PRESENT ILLNESS
33yo  at 38w2d, LUCIA 24 by LMP c/w first trimester sono, presenting to L&D ***   Patient was seen in triage yesterday AM and was found to be 2/50/-2.  GBS positive.    Pregnancy c/b:    #AMA  -NIPTs low risk    #PCN and macrobid allergy  -tolerated amoxicillin per patient

## 2024-12-13 NOTE — OB PROVIDER H&P - HISTORY OF PRESENT ILLNESS
35yo  at 38w2d, LUCIA 24 by LMP c/w first trimester sono, presenting to L&D for contractions every 2-10 minutes that are intense rated 8/10 at worse. She denies LOF or VB. She confirms FM. She reports passage of yellow vaginal discharge. Patient was seen in triage yesterday AM and was found to be 2/50/-2.  GBS positive.    Pregnancy c/b:    #AMA  -NIPTs low risk    #PCN and macrobid allergy  -tolerated amoxicillin per patient

## 2024-12-13 NOTE — OB PROVIDER H&P - ATTENDING COMMENTS
agree with above  35 yo  @38w2d presented with contractions  on presentation MVP found to be 1.7cm  GBS POS  FT NSVDx1    /-3, vtx, intact  tracing category I  ctx q4min    admit  pitocin for IOL  ampicillin GBS ppx  cont efm and toco  pain management PRN  cleear liquids  monitor vitals  admission  labs  anticipate delivery

## 2024-12-13 NOTE — OB PROVIDER H&P - ASSESSMENT
35yo  at 38w2d GBS pos, oligohydramnios admitted for augmentation    -Admit to L+D  -Monitor EFM and TOCO   -IVF and labs  -Pain control PRN  -GBS ppx: ampicillin  -Clear liquid diet as tolerated  -Monitor vitals  -plan for pitocin and AROM    D/w Dr. Da Silva and Dr. Morris

## 2024-12-14 LAB
BASOPHILS # BLD AUTO: 0.03 K/UL — SIGNIFICANT CHANGE UP (ref 0–0.2)
BASOPHILS NFR BLD AUTO: 0.2 % — SIGNIFICANT CHANGE UP (ref 0–1)
EOSINOPHIL # BLD AUTO: 0.12 K/UL — SIGNIFICANT CHANGE UP (ref 0–0.7)
EOSINOPHIL NFR BLD AUTO: 0.9 % — SIGNIFICANT CHANGE UP (ref 0–8)
HCT VFR BLD CALC: 37.1 % — SIGNIFICANT CHANGE UP (ref 37–47)
HGB BLD-MCNC: 12.5 G/DL — SIGNIFICANT CHANGE UP (ref 12–16)
IMM GRANULOCYTES NFR BLD AUTO: 0.5 % — HIGH (ref 0.1–0.3)
L&D DRUG SCREEN, URINE: SIGNIFICANT CHANGE UP
LYMPHOCYTES # BLD AUTO: 2.68 K/UL — SIGNIFICANT CHANGE UP (ref 1.2–3.4)
LYMPHOCYTES # BLD AUTO: 20.2 % — LOW (ref 20.5–51.1)
MCHC RBC-ENTMCNC: 31.6 PG — HIGH (ref 27–31)
MCHC RBC-ENTMCNC: 33.7 G/DL — SIGNIFICANT CHANGE UP (ref 32–37)
MCV RBC AUTO: 93.9 FL — SIGNIFICANT CHANGE UP (ref 81–99)
MONOCYTES # BLD AUTO: 1.01 K/UL — HIGH (ref 0.1–0.6)
MONOCYTES NFR BLD AUTO: 7.6 % — SIGNIFICANT CHANGE UP (ref 1.7–9.3)
NEUTROPHILS # BLD AUTO: 9.39 K/UL — HIGH (ref 1.4–6.5)
NEUTROPHILS NFR BLD AUTO: 70.6 % — SIGNIFICANT CHANGE UP (ref 42.2–75.2)
NRBC # BLD: 0 /100 WBCS — SIGNIFICANT CHANGE UP (ref 0–0)
PLATELET # BLD AUTO: 197 K/UL — SIGNIFICANT CHANGE UP (ref 130–400)
PMV BLD: 11.1 FL — HIGH (ref 7.4–10.4)
PRENATAL SYPHILIS TEST: SIGNIFICANT CHANGE UP
RBC # BLD: 3.95 M/UL — LOW (ref 4.2–5.4)
RBC # FLD: 13.3 % — SIGNIFICANT CHANGE UP (ref 11.5–14.5)
WBC # BLD: 13.3 K/UL — HIGH (ref 4.8–10.8)
WBC # FLD AUTO: 13.3 K/UL — HIGH (ref 4.8–10.8)

## 2024-12-14 PROCEDURE — 59409 OBSTETRICAL CARE: CPT | Mod: U7

## 2024-12-14 RX ORDER — OXYCODONE HYDROCHLORIDE 30 MG/1
5 TABLET ORAL
Refills: 0 | Status: DISCONTINUED | OUTPATIENT
Start: 2024-12-14 | End: 2024-12-15

## 2024-12-14 RX ORDER — LANOLIN 72 %
1 OINTMENT (GRAM) TOPICAL EVERY 6 HOURS
Refills: 0 | Status: DISCONTINUED | OUTPATIENT
Start: 2024-12-14 | End: 2024-12-15

## 2024-12-14 RX ORDER — DIPHENHYDRAMINE HCL 25 MG
25 CAPSULE ORAL EVERY 4 HOURS
Refills: 0 | Status: DISCONTINUED | OUTPATIENT
Start: 2024-12-14 | End: 2024-12-15

## 2024-12-14 RX ORDER — WITCH HAZEL 50 G/100ML
1 SOLUTION RECTAL EVERY 4 HOURS
Refills: 0 | Status: DISCONTINUED | OUTPATIENT
Start: 2024-12-14 | End: 2024-12-15

## 2024-12-14 RX ORDER — ACETAMINOPHEN 500MG 500 MG/1
975 TABLET, COATED ORAL EVERY 6 HOURS
Refills: 0 | Status: COMPLETED | OUTPATIENT
Start: 2024-12-14 | End: 2025-11-12

## 2024-12-14 RX ORDER — KETOROLAC TROMETHAMINE 30 MG/ML
30 INJECTION INTRAMUSCULAR; INTRAVENOUS ONCE
Refills: 0 | Status: DISCONTINUED | OUTPATIENT
Start: 2024-12-14 | End: 2024-12-14

## 2024-12-14 RX ORDER — SODIUM CHLORIDE 0.65 %
1 AEROSOL, SPRAY (ML) NASAL EVERY 12 HOURS
Refills: 0 | Status: DISCONTINUED | OUTPATIENT
Start: 2024-12-14 | End: 2024-12-14

## 2024-12-14 RX ORDER — TETANUS TOXOID, REDUCED DIPHTHERIA TOXOID AND ACELLULAR PERTUSSIS VACCINE, ADSORBED 5; 2.5; 8; 8; 2.5 [IU]/.5ML; [IU]/.5ML; UG/.5ML; UG/.5ML; UG/.5ML
0.5 SUSPENSION INTRAMUSCULAR ONCE
Refills: 0 | Status: DISCONTINUED | OUTPATIENT
Start: 2024-12-14 | End: 2024-12-15

## 2024-12-14 RX ORDER — SIMETHICONE 125 MG
80 CAPSULE ORAL EVERY 4 HOURS
Refills: 0 | Status: DISCONTINUED | OUTPATIENT
Start: 2024-12-14 | End: 2024-12-15

## 2024-12-14 RX ORDER — ACETAMINOPHEN 500MG 500 MG/1
975 TABLET, COATED ORAL EVERY 6 HOURS
Refills: 0 | Status: DISCONTINUED | OUTPATIENT
Start: 2024-12-14 | End: 2024-12-14

## 2024-12-14 RX ORDER — ACETAMINOPHEN 500MG 500 MG/1
975 TABLET, COATED ORAL
Refills: 0 | Status: DISCONTINUED | OUTPATIENT
Start: 2024-12-14 | End: 2024-12-15

## 2024-12-14 RX ORDER — SODIUM CHLORIDE 9 MG/ML
3 INJECTION, SOLUTION INTRAMUSCULAR; INTRAVENOUS; SUBCUTANEOUS EVERY 8 HOURS
Refills: 0 | Status: DISCONTINUED | OUTPATIENT
Start: 2024-12-14 | End: 2024-12-15

## 2024-12-14 RX ORDER — IBUPROFEN 200 MG
600 TABLET ORAL EVERY 6 HOURS
Refills: 0 | Status: DISCONTINUED | OUTPATIENT
Start: 2024-12-14 | End: 2024-12-15

## 2024-12-14 RX ORDER — MEASLES,MUMPS,RUBELLA VACC/PF 12500/0.5
0.5 VIAL (EA) SUBCUTANEOUS ONCE
Refills: 0 | Status: COMPLETED | OUTPATIENT
Start: 2024-12-14 | End: 2024-12-14

## 2024-12-14 RX ORDER — HYDROCORTISONE BUTYRATE 0.1 %
1 CREAM (GRAM) TOPICAL EVERY 6 HOURS
Refills: 0 | Status: DISCONTINUED | OUTPATIENT
Start: 2024-12-14 | End: 2024-12-15

## 2024-12-14 RX ORDER — DEXAMETHASONE 1.5 MG/1
4 TABLET ORAL EVERY 6 HOURS
Refills: 0 | Status: DISCONTINUED | OUTPATIENT
Start: 2024-12-14 | End: 2024-12-14

## 2024-12-14 RX ORDER — ONDANSETRON HYDROCHLORIDE 4 MG/1
4 TABLET, FILM COATED ORAL EVERY 6 HOURS
Refills: 0 | Status: DISCONTINUED | OUTPATIENT
Start: 2024-12-14 | End: 2024-12-14

## 2024-12-14 RX ORDER — FENTANYL/BUPIVACAINE/NS/PF 2-625MCG/1
250 PLASTIC BAG, INJECTION (ML) INJECTION
Refills: 0 | Status: DISCONTINUED | OUTPATIENT
Start: 2024-12-14 | End: 2024-12-14

## 2024-12-14 RX ORDER — OXYCODONE HYDROCHLORIDE 30 MG/1
5 TABLET ORAL ONCE
Refills: 0 | Status: DISCONTINUED | OUTPATIENT
Start: 2024-12-14 | End: 2024-12-15

## 2024-12-14 RX ORDER — .BETA.-CAROTENE, SODIUM ACETATE, ASCORBIC ACID, CHOLECALCIFEROL, .ALPHA.-TOCOPHEROL ACETATE, DL-, THIAMINE MONONITRATE, RIBOFLAVIN, NIACINAMIDE, PYRIDOXINE HYDROCHLORIDE, FOLIC ACID, CYANOCOBALAMIN, CALCIUM CARBONATE, FERROUS FUMARATE, ZINC OXIDE AND CUPRIC OXIDE 2000; 2000; 120; 400; 22; 1.84; 3; 20; 10; 1; 12; 200; 27; 25; 2 [IU]/1; [IU]/1; MG/1; [IU]/1; MG/1; MG/1; MG/1; MG/1; MG/1; MG/1; UG/1; MG/1; MG/1; MG/1; MG/1
1 TABLET ORAL DAILY
Refills: 0 | Status: DISCONTINUED | OUTPATIENT
Start: 2024-12-14 | End: 2024-12-15

## 2024-12-14 RX ORDER — IBUPROFEN 200 MG
600 TABLET ORAL EVERY 6 HOURS
Refills: 0 | Status: COMPLETED | OUTPATIENT
Start: 2024-12-14 | End: 2025-11-12

## 2024-12-14 RX ORDER — ACETAMINOPHEN 500MG 500 MG/1
1000 TABLET, COATED ORAL ONCE
Refills: 0 | Status: DISCONTINUED | OUTPATIENT
Start: 2024-12-14 | End: 2024-12-14

## 2024-12-14 RX ORDER — BENZOCAINE 10 %
1 OINTMENT (GRAM) TOPICAL EVERY 6 HOURS
Refills: 0 | Status: DISCONTINUED | OUTPATIENT
Start: 2024-12-14 | End: 2024-12-15

## 2024-12-14 RX ORDER — DIPHENHYDRAMINE HCL 25 MG
25 CAPSULE ORAL EVERY 6 HOURS
Refills: 0 | Status: DISCONTINUED | OUTPATIENT
Start: 2024-12-14 | End: 2024-12-15

## 2024-12-14 RX ORDER — GUAIFENESIN 400 MG
600 TABLET ORAL EVERY 12 HOURS
Refills: 0 | Status: DISCONTINUED | OUTPATIENT
Start: 2024-12-14 | End: 2024-12-15

## 2024-12-14 RX ORDER — DIBUCAINE 1 %
1 OINTMENT (GRAM) TOPICAL EVERY 6 HOURS
Refills: 0 | Status: DISCONTINUED | OUTPATIENT
Start: 2024-12-14 | End: 2024-12-15

## 2024-12-14 RX ORDER — NALOXONE HCL 0.4 MG/ML
0.1 AMPUL (ML) INJECTION
Refills: 0 | Status: DISCONTINUED | OUTPATIENT
Start: 2024-12-14 | End: 2024-12-14

## 2024-12-14 RX ADMIN — Medication 108 GRAM(S): at 03:23

## 2024-12-14 RX ADMIN — Medication 25 MILLIGRAM(S): at 10:57

## 2024-12-14 RX ADMIN — ACETAMINOPHEN 500MG 975 MILLIGRAM(S): 500 TABLET, COATED ORAL at 15:38

## 2024-12-14 RX ADMIN — SODIUM CHLORIDE 3 MILLILITER(S): 9 INJECTION, SOLUTION INTRAMUSCULAR; INTRAVENOUS; SUBCUTANEOUS at 21:51

## 2024-12-14 RX ADMIN — KETOROLAC TROMETHAMINE 30 MILLIGRAM(S): 30 INJECTION INTRAMUSCULAR; INTRAVENOUS at 10:54

## 2024-12-14 RX ADMIN — ACETAMINOPHEN 500MG 975 MILLIGRAM(S): 500 TABLET, COATED ORAL at 16:08

## 2024-12-14 RX ADMIN — Medication 1 SPRAY(S): at 01:26

## 2024-12-14 RX ADMIN — Medication 600 MILLIGRAM(S): at 17:54

## 2024-12-14 RX ADMIN — SODIUM CHLORIDE 3 MILLILITER(S): 9 INJECTION, SOLUTION INTRAMUSCULAR; INTRAVENOUS; SUBCUTANEOUS at 14:06

## 2024-12-14 RX ADMIN — ACETAMINOPHEN 500MG 975 MILLIGRAM(S): 500 TABLET, COATED ORAL at 22:34

## 2024-12-14 RX ADMIN — ACETAMINOPHEN 500MG 975 MILLIGRAM(S): 500 TABLET, COATED ORAL at 22:04

## 2024-12-14 RX ADMIN — Medication 2 MILLIUNIT(S)/MIN: at 00:07

## 2024-12-14 RX ADMIN — Medication 600 MILLIGRAM(S): at 17:53

## 2024-12-14 NOTE — PROGRESS NOTE ADULT - ASSESSMENT
A/P: 34y  at 38w2d GBS pos in labor  -continue pitocin  -epidural in place  -AROM at 0349, scant  -cont efm/toco  -cont to monitor vitals  -cont iv hydration     A/P: 34y  at 38w2d GBS pos, gestational htn r/o preeclampsia, in labor  -continue pitocin  -epidural in place  -AROM at 0349, scant  -cont efm/toco  -cont to monitor vitals  -cont iv hydration  -PELs to be ordered postpartum

## 2024-12-14 NOTE — PROCEDURE NOTE - ADDITIONAL PROCEDURE DETAILS
Epidural Note. Patient sitting. Lumbar epidural performed at L3-4. Pulse oximetry, NIBP, FHRM in place. Patient sitting. Sterile gloves, Chloro-prep. 1% lidocaine for local infiltration. 1 Attempt. KIRSTEN to Air 7cm. 27g spinal needle inserted. + CSF. Denies paresthesia. Spinal needle removed. Flexitip Catheter threaded easily to 20cm. 17g Touhy needle removed. Epidural catheter pulled back and secured in place at 12cm. Negative aspiration for CSF and blood. Test dose consisting of 3ml 1.5% lidocaine with epinephrine was negative. Remaining 2ml of test dose consisting of 1.5% lidocaine with epinephrine. Patient tolerated procedure and was hemodynamically stable throughout. 10 cc 0.625% bupivacaine epidural.  T10 level bilaterally. Uncomplicated procedure. Covering attending physician aware. Vitals per nursing epidural protocol.
Epidural Note. Patient sitting. Lumbar epidural performed at L3-4. Pulse oximetry, NIBP, FHRM in place. Patient sitting. Sterile gloves, Chloro-prep. 1% lidocaine for local infiltration. 1 Attempt. KIRSTEN to Air 5cm. 27g spinal needle inserted. + CSF. Denies paresthesia. Spinal needle removed. Flexitip Catheter threaded easily to 20cm. 17g Touhy needle removed. Epidural cathater pulled back and secured in place at 11 cm. Negative aspiration for CSF and blood. Test dose consisting of 3ml 2% lidocaine with epinephrine was negative. Remaining 2ml of test dose consisting of 2% lidocaine with epinephrine. Patient tolerated procedure and was hemodynamically stable throughout. 10 cc .0625% bupivacaine epidural.  T10 level bilaterally. Uncomplicated procedure. Covering attending physician aware. Vitals per nursing epidural protocol.
Epidural Note. Patient sitting. Lumbar epidural performed at L3-4. Pulse oximetry, NIBP, FHRM in place. Patient sitting. Sterile gloves, Chloro-prep. 1% lidocaine for local infiltration. 1 Attempt. KIRSTEN to Air 5.5cm. 27g spinal needle inserted. + CSF. Denies paresthesia. Spinal needle removed. Flexitip Catheter threaded easily to 20cm. 17g Touhy needle removed. Epidural cathater pulled back and secured in place at 12 cm. Negative aspiration for CSF and blood. Test dose consisting of 3ml 2% lidocaine with epinephrine was negative. Remaining 2ml of test dose consisting of 2% lidocaine with epinephrine. Patient tolerated procedure and was hemodynamically stable throughout. 10 cc .0625% bupivacaine epidural.  T10 level bilaterally. Uncomplicated procedure. Covering attending physician aware. Vitals per nursing epidural protocol.

## 2024-12-14 NOTE — OB RN DELIVERY SUMMARY - NS_DELIVERYRN_OBGYN_ALL_OB_FT
Ivanna Mobley RN [Flexion] : flexion [Extension] : extension [Rotation to left] : rotation to left [Rotation to right] : rotation to right [1] : right 1st digit [2] : right 2nd digit [3] : right 3rd digit [4] : right 4th digit [5] : right 5th digit [] : sensation of right upper extremities not intact [[id number]] : forward flexion 30 degrees [de-identified] : extension 10 degrees

## 2024-12-14 NOTE — OB RN DELIVERY SUMMARY - NSSELHIDDEN_OBGYN_ALL_OB_FT
[NS_DeliveryAttending1_OBGYN_ALL_OB_FT:[id number]=],[NS_DeliveryRN_OBGYN_ALL_OB_FT:[id number]=] [NS_DeliveryAttending1_OBGYN_ALL_OB_FT:[id number]=],[NS_DeliveryRN_OBGYN_ALL_OB_FT:[id number]=],[NS_CirculateRN2_OBGYN_ALL_OB_FT:[id number]=]

## 2024-12-14 NOTE — OB RN DELIVERY SUMMARY - NS_SEPSISRSKCALC_OBGYN_ALL_OB_FT
EOS calculated successfully. EOS Risk Factor: 0.5/1000 live births ([facility] national incidence); GA=38w3d; Temp=98.7; ROM=2.667; GBS='Positive'; Antibiotics='Broad spectrum antibiotics 2-3.9 hrs prior to birth'

## 2024-12-14 NOTE — PROCEDURE NOTE - NSINFORMCONSENT_GEN_A_CORE
Benefits, risks, and possible complications of procedure explained to patient/caregiver who verbalized understanding and gave written consent.
Benefits, risks, and possible complications of procedure explained to patient/caregiver who verbalized understanding and gave written consent.
Morbid obesity

## 2024-12-14 NOTE — OB PROVIDER DELIVERY SUMMARY - NSSELHIDDEN_OBGYN_ALL_OB_FT
[NS_DeliveryAttending1_OBGYN_ALL_OB_FT:[id number]=],[NS_DeliveryRN_OBGYN_ALL_OB_FT:[id number]=],[NS_CirculateRN2_OBGYN_ALL_OB_FT:[id number]=]

## 2024-12-14 NOTE — PROGRESS NOTE ADULT - SUBJECTIVE AND OBJECTIVE BOX
PGY2 Note    Patient seen at bedside for evaluation of labor progression, doing well, no complaints.     T(F): 98.06 (24 @ 04:25), Max: 98.7 (24 @ 21:37)  HR: 90 (24 @ 04:54) (63 - 96)  BP: 115/56 (24 @ 04:54) (105/55 - 158/67)  RR: 18 (24 @ 03:44) (18 - 18)    EFM: 140/mod/early decelerations  TOCO: q2-4  SVE: 6/100/-1    Medications:  ampicillin  IVPB: 200 mL/Hr (24 @ 23:23)  ampicillin  IVPB: 108 mL/Hr (24 @ 03:23)  lactated ringers.: 125 mL/Hr (24 @ 22:41)  oxytocin Infusion.: 2 mL/Hr (24 @ 22:56)      Labs:                        12.5   13.30 )-----------( 197      ( 13 Dec 2024 23:29 )             37.1           ABO RH Interpretation: A POS (24 @ 23:29)    Antibody Screen: NEG (24 @ 23:29)    Urinalysis Basic - ( 13 Dec 2024 23:29 )    Color: Yellow / Appearance: Cloudy / S.005 / pH: x  Gluc: x / Ketone: Negative mg/dL  / Bili: Negative / Urobili: 0.2 mg/dL   Blood: x / Protein: Negative mg/dL / Nitrite: Negative   Leuk Esterase: Large / RBC: 1 /HPF / WBC 80 /HPF   Sq Epi: x / Non Sq Epi: 8 /HPF / Bacteria: Negative /HPF        Prenatal Syphilis Test: Nonreact (24 @ 23:29)

## 2024-12-14 NOTE — OB PROVIDER DELIVERY SUMMARY - NSPROVIDERDELIVERYNOTE_OBGYN_ALL_OB_FT
Patient was fully dilated and pushing. Fetal head was OA and restituted to LOT. The anterior and posterior shoulders delivered, followed by the remaining body atraumatically. Delayed cord clamping was performed, and then clamped and cut. Cord blood gases collected x2. The  was handed to the mother and RN. The placenta delivered intact with membranes. Pitocin was administered without additional interventions. Uterus massaged, fundus found to be firm. Cervix, vagina and perineum inspected with small bilateral labial laceration noted, repaired using 3-0 chromic in the usual fashion with good hemostasis.     Viable female infant delivered, weighing 6lb3oz, with APGARs 9/9    Laceration: bilateral labial  EBL 50cc

## 2024-12-14 NOTE — PRE-ANESTHESIA EVALUATION ADULT - NSANTHDIETYNSD_GEN_ALL_CORE
Yes
Pt Corey speaking. As per Son Jayesh " For over a week she has pain in neck and rt shoulder pain . Pain in neck ,  rt shoulder arm  and headache. She saw MD gave her medication not helping the pain....feel weakness in rt hand. Sent by MD for MRI . " Denies trauma. hx of DM2, high cholesterol

## 2024-12-14 NOTE — PROCEDURAL SAFETY CHECKLIST WITH OR WITHOUT SEDATION - NSPRESEDATIONFT_GEN_ALL_CORE
Let message to call back.    Physician confirms case reviewed for anesthesia consultation requirements.

## 2024-12-15 VITALS
RESPIRATION RATE: 18 BRPM | SYSTOLIC BLOOD PRESSURE: 122 MMHG | DIASTOLIC BLOOD PRESSURE: 76 MMHG | TEMPERATURE: 98 F | HEART RATE: 63 BPM | OXYGEN SATURATION: 100 %

## 2024-12-15 LAB
ALBUMIN SERPL ELPH-MCNC: 3.1 G/DL — LOW (ref 3.5–5.2)
ALP SERPL-CCNC: 104 U/L — SIGNIFICANT CHANGE UP (ref 30–115)
ALT FLD-CCNC: 13 U/L — SIGNIFICANT CHANGE UP (ref 0–41)
ANION GAP SERPL CALC-SCNC: 9 MMOL/L — SIGNIFICANT CHANGE UP (ref 7–14)
AST SERPL-CCNC: 22 U/L — SIGNIFICANT CHANGE UP (ref 0–41)
BASOPHILS # BLD AUTO: 0.03 K/UL — SIGNIFICANT CHANGE UP (ref 0–0.2)
BASOPHILS NFR BLD AUTO: 0.3 % — SIGNIFICANT CHANGE UP (ref 0–1)
BILIRUB SERPL-MCNC: <0.2 MG/DL — SIGNIFICANT CHANGE UP (ref 0.2–1.2)
BUN SERPL-MCNC: 7 MG/DL — LOW (ref 10–20)
CALCIUM SERPL-MCNC: 8.6 MG/DL — SIGNIFICANT CHANGE UP (ref 8.4–10.5)
CHLORIDE SERPL-SCNC: 108 MMOL/L — SIGNIFICANT CHANGE UP (ref 98–110)
CO2 SERPL-SCNC: 22 MMOL/L — SIGNIFICANT CHANGE UP (ref 17–32)
CREAT SERPL-MCNC: 0.6 MG/DL — LOW (ref 0.7–1.5)
EGFR: 120 ML/MIN/1.73M2 — SIGNIFICANT CHANGE UP
EOSINOPHIL # BLD AUTO: 0.18 K/UL — SIGNIFICANT CHANGE UP (ref 0–0.7)
EOSINOPHIL NFR BLD AUTO: 1.6 % — SIGNIFICANT CHANGE UP (ref 0–8)
GLUCOSE SERPL-MCNC: 69 MG/DL — LOW (ref 70–99)
HCT VFR BLD CALC: 32.5 % — LOW (ref 37–47)
HGB BLD-MCNC: 10.9 G/DL — LOW (ref 12–16)
IMM GRANULOCYTES NFR BLD AUTO: 0.6 % — HIGH (ref 0.1–0.3)
LDH SERPL L TO P-CCNC: 253 — HIGH (ref 50–242)
LYMPHOCYTES # BLD AUTO: 2.97 K/UL — SIGNIFICANT CHANGE UP (ref 1.2–3.4)
LYMPHOCYTES # BLD AUTO: 27 % — SIGNIFICANT CHANGE UP (ref 20.5–51.1)
MCHC RBC-ENTMCNC: 31.8 PG — HIGH (ref 27–31)
MCHC RBC-ENTMCNC: 33.5 G/DL — SIGNIFICANT CHANGE UP (ref 32–37)
MCV RBC AUTO: 94.8 FL — SIGNIFICANT CHANGE UP (ref 81–99)
MONOCYTES # BLD AUTO: 0.64 K/UL — HIGH (ref 0.1–0.6)
MONOCYTES NFR BLD AUTO: 5.8 % — SIGNIFICANT CHANGE UP (ref 1.7–9.3)
NEUTROPHILS # BLD AUTO: 7.11 K/UL — HIGH (ref 1.4–6.5)
NEUTROPHILS NFR BLD AUTO: 64.7 % — SIGNIFICANT CHANGE UP (ref 42.2–75.2)
NRBC # BLD: 0 /100 WBCS — SIGNIFICANT CHANGE UP (ref 0–0)
PLATELET # BLD AUTO: 167 K/UL — SIGNIFICANT CHANGE UP (ref 130–400)
PMV BLD: 10.9 FL — HIGH (ref 7.4–10.4)
POTASSIUM SERPL-MCNC: 3.7 MMOL/L — SIGNIFICANT CHANGE UP (ref 3.5–5)
POTASSIUM SERPL-SCNC: 3.7 MMOL/L — SIGNIFICANT CHANGE UP (ref 3.5–5)
PROT SERPL-MCNC: 4.7 G/DL — LOW (ref 6–8)
RBC # BLD: 3.43 M/UL — LOW (ref 4.2–5.4)
RBC # FLD: 13.2 % — SIGNIFICANT CHANGE UP (ref 11.5–14.5)
SODIUM SERPL-SCNC: 139 MMOL/L — SIGNIFICANT CHANGE UP (ref 135–146)
URATE SERPL-MCNC: 4.7 MG/DL — SIGNIFICANT CHANGE UP (ref 2.5–7)
WBC # BLD: 11 K/UL — HIGH (ref 4.8–10.8)
WBC # FLD AUTO: 11 K/UL — HIGH (ref 4.8–10.8)

## 2024-12-15 RX ORDER — ACETAMINOPHEN 500MG 500 MG/1
3 TABLET, COATED ORAL
Qty: 0 | Refills: 0 | DISCHARGE
Start: 2024-12-15

## 2024-12-15 RX ORDER — IBUPROFEN 200 MG
1 TABLET ORAL
Qty: 0 | Refills: 0 | DISCHARGE
Start: 2024-12-15

## 2024-12-15 RX ADMIN — Medication 600 MILLIGRAM(S): at 06:33

## 2024-12-15 RX ADMIN — SODIUM CHLORIDE 3 MILLILITER(S): 9 INJECTION, SOLUTION INTRAMUSCULAR; INTRAVENOUS; SUBCUTANEOUS at 05:58

## 2024-12-15 RX ADMIN — ACETAMINOPHEN 500MG 975 MILLIGRAM(S): 500 TABLET, COATED ORAL at 08:29

## 2024-12-15 RX ADMIN — Medication 600 MILLIGRAM(S): at 06:03

## 2024-12-15 RX ADMIN — Medication 600 MILLIGRAM(S): at 11:22

## 2024-12-15 RX ADMIN — ACETAMINOPHEN 500MG 975 MILLIGRAM(S): 500 TABLET, COATED ORAL at 02:52

## 2024-12-15 RX ADMIN — Medication 600 MILLIGRAM(S): at 11:52

## 2024-12-15 RX ADMIN — ACETAMINOPHEN 500MG 975 MILLIGRAM(S): 500 TABLET, COATED ORAL at 03:22

## 2024-12-15 RX ADMIN — .BETA.-CAROTENE, SODIUM ACETATE, ASCORBIC ACID, CHOLECALCIFEROL, .ALPHA.-TOCOPHEROL ACETATE, DL-, THIAMINE MONONITRATE, RIBOFLAVIN, NIACINAMIDE, PYRIDOXINE HYDROCHLORIDE, FOLIC ACID, CYANOCOBALAMIN, CALCIUM CARBONATE, FERROUS FUMARATE, ZINC OXIDE AND CUPRIC OXIDE 1 TABLET(S): 2000; 2000; 120; 400; 22; 1.84; 3; 20; 10; 1; 12; 200; 27; 25; 2 TABLET ORAL at 11:22

## 2024-12-15 RX ADMIN — ACETAMINOPHEN 500MG 975 MILLIGRAM(S): 500 TABLET, COATED ORAL at 09:00

## 2024-12-15 NOTE — DISCHARGE NOTE OB - NS MD DC FALL RISK RISK
For information on Fall & Injury Prevention, visit: [email or web address] OR  [email or web address] OR  https://www.cdc.gov/steadi/patient.html

## 2024-12-15 NOTE — DISCHARGE NOTE OB - CARE PROVIDER_API CALL
Cora Morris  Obstetrics and Gynecology  [address] [place] [id number]  Phone: [phone]  Fax: [phone]  Follow Up Time:

## 2024-12-15 NOTE — DISCHARGE NOTE OB - FINANCIAL ASSISTANCE
[facility] provides services at a reduced cost to those who are determined to be eligible through [facility]’s financial assistance program. Information regarding [facility]’s financial assistance program can be found by going to [email or web address] or by calling [phone].

## 2024-12-15 NOTE — PROGRESS NOTE ADULT - ASSESSMENT
33 yo P2, s/p uncomplicated , PPD1, doing well    - routine postpartum care  - pain management PRN  - monitor vitals and bleeding  - ambulation  - regular diet  - discharge today, f/up 6wks

## 2024-12-15 NOTE — DISCHARGE NOTE OB - PHYSICIAN SECTION COMPLETE
Yes
You can access the FollowMyHealth Patient Portal offered by [facility] by registering at the following website: [email or web address]. By joining [employer]’s FollowMyHealth portal, you will also be able to view your health information using other applications (apps) compatible with our system.

## 2024-12-15 NOTE — DISCHARGE NOTE OB - PATIENT PORTAL LINK FT
You can access the FollowMyHealth Patient Portal offered by [facility] by registering at the following website: [email or web address]. By joining [employer]’s FollowMyHealth portal, you will also be able to view your health information using other applications (apps) compatible with our system.

## 2024-12-15 NOTE — PROGRESS NOTE ADULT - SUBJECTIVE AND OBJECTIVE BOX
OB Attending Post Partum Note    Subjective: Patient seen and examined at bedside. Doing well, no complaints. Pain controlled, minimal vaginal bleeding. Denies fever, chills, CP, SOB, N/V, LE pain. She is ambulating, voiding, passing flatus, tolerating regular diet, bottle feeding.     Physical exam:    Vital Signs Last 24 Hrs  T(C): 36.6 (15 Dec 2024 07:28), Max: 36.9 (14 Dec 2024 16:03)  T(F): 97.9 (15 Dec 2024 07:28), Max: 98.4 (14 Dec 2024 16:03)  HR: 55 (15 Dec 2024 07:28) (55 - 64)  BP: 133/83 (15 Dec 2024 07:28) (106/67 - 138/79)  BP(mean): --  RR: 18 (15 Dec 2024 07:28) (18 - 18)  SpO2: 98% (15 Dec 2024 07:28) (98% - 100%)      Gen: NAD  CVS: s1s2, rrr  Lungs: ctab, no rhonchi or rales  Abdomen: Soft, nontender, no distension , firm uterine fundus below umbilicus.  Pelvic: Normal lochia noted  Ext: No calf tenderness    Diet: regular  meds:   acetaminophen     Tablet ..   975 milliGRAM(s) Oral (12-15-24 @ 08:29)   975 milliGRAM(s) Oral (12-15-24 @ 02:52)   975 milliGRAM(s) Oral (12-14-24 @ 22:04)   975 milliGRAM(s) Oral (12-14-24 @ 15:38)    diphenhydrAMINE   25 milliGRAM(s) Oral (12-14-24 @ 10:57)    guaiFENesin ER   600 milliGRAM(s) Oral (12-15-24 @ 06:03)   600 milliGRAM(s) Oral (12-14-24 @ 17:53)    ibuprofen  Tablet.   600 milliGRAM(s) Oral (12-15-24 @ 06:03)   600 milliGRAM(s) Oral (12-14-24 @ 17:54)    ketorolac   Injectable   30 milliGRAM(s) IV Push (12-14-24 @ 10:54)        LABS:                        10.9   11.00 )-----------( 167      ( 15 Dec 2024 06:38 )             32.5                         12.5   13.30 )-----------( 197      ( 13 Dec 2024 23:29 )             37.1       12-15-24 @ 06:38      139  |  108  |  7[L]  ----------------------------<  69[L]  3.7   |  22  |  0.6[L]        Ca    8.6      15 Dec 2024 06:38    TPro  4.7[L]  /  Alb  3.1[L]  /  TBili  <0.2  /  DBili  x   /  AST  22  /  ALT  13  /  AlkPhos  104  12-15-24 @ 06:38

## 2024-12-16 DIAGNOSIS — O26.853 SPOTTING COMPLICATING PREGNANCY, THIRD TRIMESTER: ICD-10-CM

## 2024-12-16 DIAGNOSIS — N83.209 UNSPECIFIED OVARIAN CYST, UNSPECIFIED SIDE: ICD-10-CM

## 2024-12-16 DIAGNOSIS — O34.83 MATERNAL CARE FOR OTHER ABNORMALITIES OF PELVIC ORGANS, THIRD TRIMESTER: ICD-10-CM

## 2024-12-16 DIAGNOSIS — O09.523 SUPERVISION OF ELDERLY MULTIGRAVIDA, THIRD TRIMESTER: ICD-10-CM

## 2024-12-16 DIAGNOSIS — Z3A.38 38 WEEKS GESTATION OF PREGNANCY: ICD-10-CM

## 2024-12-16 DIAGNOSIS — O09.13 SUPERVISION OF PREGNANCY WITH HISTORY OF ECTOPIC PREGNANCY, THIRD TRIMESTER: ICD-10-CM

## 2024-12-16 DIAGNOSIS — O47.1 FALSE LABOR AT OR AFTER 37 COMPLETED WEEKS OF GESTATION: ICD-10-CM

## 2024-12-17 ENCOUNTER — APPOINTMENT (OUTPATIENT)
Dept: OBGYN | Facility: CLINIC | Age: 34
End: 2024-12-17

## 2024-12-19 ENCOUNTER — APPOINTMENT (OUTPATIENT)
Dept: OBGYN | Facility: CLINIC | Age: 34
End: 2024-12-19

## 2024-12-21 DIAGNOSIS — Z28.09 IMMUNIZATION NOT CARRIED OUT BECAUSE OF OTHER CONTRAINDICATION: ICD-10-CM

## 2024-12-21 DIAGNOSIS — O41.03X0 OLIGOHYDRAMNIOS, THIRD TRIMESTER, NOT APPLICABLE OR UNSPECIFIED: ICD-10-CM

## 2024-12-21 DIAGNOSIS — Z28.21 IMMUNIZATION NOT CARRIED OUT BECAUSE OF PATIENT REFUSAL: ICD-10-CM

## 2024-12-21 DIAGNOSIS — Z3A.38 38 WEEKS GESTATION OF PREGNANCY: ICD-10-CM

## 2024-12-21 DIAGNOSIS — Z88.0 ALLERGY STATUS TO PENICILLIN: ICD-10-CM

## 2025-01-24 ENCOUNTER — APPOINTMENT (OUTPATIENT)
Dept: OBGYN | Facility: CLINIC | Age: 35
End: 2025-01-24
Payer: MEDICAID

## 2025-01-24 VITALS
WEIGHT: 150 LBS | BODY MASS INDEX: 24.99 KG/M2 | SYSTOLIC BLOOD PRESSURE: 122 MMHG | HEIGHT: 65 IN | DIASTOLIC BLOOD PRESSURE: 73 MMHG

## 2025-01-24 LAB
APPEARANCE: CLEAR
BILIRUBIN URINE: NEGATIVE
BLOOD URINE: NEGATIVE
COLOR: YELLOW
GLUCOSE QUALITATIVE U: NEGATIVE
KETONES URINE: NEGATIVE
LEUKOCYTE ESTERASE URINE: NEGATIVE
NITRITE URINE: NEGATIVE
PH URINE: 6
PROTEIN URINE: NEGATIVE
SPECIFIC GRAVITY URINE: 1.02
UROBILINOGEN URINE: 0.2 (ref 0.2–?)

## 2025-02-18 DIAGNOSIS — N76.0 ACUTE VAGINITIS: ICD-10-CM

## 2025-02-18 RX ORDER — FLUCONAZOLE 150 MG/1
150 TABLET ORAL
Qty: 2 | Refills: 1 | Status: ACTIVE | COMMUNITY
Start: 2025-02-18 | End: 1900-01-01

## 2025-02-27 ENCOUNTER — APPOINTMENT (OUTPATIENT)
Dept: OBGYN | Facility: CLINIC | Age: 35
End: 2025-02-27

## 2025-02-27 RX ORDER — CLOTRIMAZOLE AND BETAMETHASONE DIPROPIONATE 10; .5 MG/G; MG/G
1-0.05 CREAM TOPICAL TWICE DAILY
Qty: 1 | Refills: 0 | Status: ACTIVE | COMMUNITY
Start: 2025-02-27 | End: 1900-01-01

## 2025-03-18 ENCOUNTER — APPOINTMENT (OUTPATIENT)
Dept: OBGYN | Facility: CLINIC | Age: 35
End: 2025-03-18

## 2025-04-11 NOTE — OB PROVIDER H&P - NSICDXNOFAMILYHX_GEN_ALL_CORE
Creatinine remains elevated. A little higher than last check. Im touching base with urology to see if they have any recommendations, otherwise may plan to get kidney ultrasound to further evaluate.
<-- Click to add NO pertinent Family History

## 2025-07-17 ENCOUNTER — APPOINTMENT (OUTPATIENT)
Dept: OBGYN | Facility: CLINIC | Age: 35
End: 2025-07-17
Payer: MEDICAID

## 2025-07-17 VITALS — BODY MASS INDEX: 23.99 KG/M2 | WEIGHT: 144 LBS | HEIGHT: 65 IN

## 2025-07-17 LAB
APPEARANCE: CLEAR
BILIRUBIN URINE: ABNORMAL
BLOOD URINE: NEGATIVE
COLOR: YELLOW
GLUCOSE QUALITATIVE U: NEGATIVE
KETONES URINE: 40
LEUKOCYTE ESTERASE URINE: NEGATIVE
NITRITE URINE: NEGATIVE
PH URINE: 6
PROTEIN URINE: >=300
SPECIFIC GRAVITY URINE: 1.02
UROBILINOGEN URINE: 0.2 (ref 0.2–?)

## 2025-07-17 PROCEDURE — 99395 PREV VISIT EST AGE 18-39: CPT

## 2025-07-21 LAB — HPV HIGH+LOW RISK DNA PNL CVX: NOT DETECTED

## 2025-08-14 ENCOUNTER — NON-APPOINTMENT (OUTPATIENT)
Age: 35
End: 2025-08-14